# Patient Record
Sex: FEMALE | Race: WHITE | NOT HISPANIC OR LATINO | Employment: OTHER | URBAN - METROPOLITAN AREA
[De-identification: names, ages, dates, MRNs, and addresses within clinical notes are randomized per-mention and may not be internally consistent; named-entity substitution may affect disease eponyms.]

---

## 2017-02-19 ENCOUNTER — RESULTS ENCOUNTER (OUTPATIENT)
Dept: INTERNAL MEDICINE | Facility: CLINIC | Age: 77
End: 2017-02-19

## 2017-02-19 DIAGNOSIS — E11.69 HYPERLIPIDEMIA ASSOCIATED WITH TYPE 2 DIABETES MELLITUS (HCC): ICD-10-CM

## 2017-02-19 DIAGNOSIS — E78.5 HYPERLIPIDEMIA ASSOCIATED WITH TYPE 2 DIABETES MELLITUS (HCC): ICD-10-CM

## 2017-02-19 DIAGNOSIS — E11.21 TYPE 2 DIABETES MELLITUS WITH DIABETIC NEPHROPATHY (HCC): ICD-10-CM

## 2017-02-19 DIAGNOSIS — I10 ESSENTIAL HYPERTENSION: ICD-10-CM

## 2017-02-21 RX ORDER — METOPROLOL TARTRATE 50 MG/1
TABLET, FILM COATED ORAL
Qty: 60 TABLET | Refills: 3 | Status: SHIPPED | OUTPATIENT
Start: 2017-02-21 | End: 2017-06-06 | Stop reason: SDUPTHER

## 2017-03-06 RX ORDER — EZETIMIBE 10 MG/1
TABLET ORAL
Qty: 30 TABLET | Refills: 3 | Status: SHIPPED | OUTPATIENT
Start: 2017-03-06 | End: 2017-07-06 | Stop reason: SDUPTHER

## 2017-05-09 DIAGNOSIS — E78.5 HYPERLIPIDEMIA ASSOCIATED WITH TYPE 2 DIABETES MELLITUS (HCC): ICD-10-CM

## 2017-05-09 DIAGNOSIS — Z00.00 HEALTH CARE MAINTENANCE: Primary | ICD-10-CM

## 2017-05-09 DIAGNOSIS — E11.69 HYPERLIPIDEMIA ASSOCIATED WITH TYPE 2 DIABETES MELLITUS (HCC): ICD-10-CM

## 2017-05-09 DIAGNOSIS — I10 ESSENTIAL HYPERTENSION: ICD-10-CM

## 2017-05-09 DIAGNOSIS — E55.9 VITAMIN D DEFICIENCY: ICD-10-CM

## 2017-05-09 DIAGNOSIS — E11.21 TYPE 2 DIABETES MELLITUS WITH DIABETIC NEPHROPATHY, UNSPECIFIED LONG TERM INSULIN USE STATUS: ICD-10-CM

## 2017-05-09 DIAGNOSIS — E04.1 THYROID NODULE: ICD-10-CM

## 2017-05-12 LAB
25(OH)D3+25(OH)D2 SERPL-MCNC: 32 NG/ML (ref 30–100)
ALBUMIN SERPL-MCNC: 4.3 G/DL (ref 3.5–5.2)
ALBUMIN/CREAT UR: 27 MG/G CREAT (ref 0–30)
ALBUMIN/GLOB SERPL: 1.7 G/DL
ALP SERPL-CCNC: 61 U/L (ref 39–117)
ALT SERPL-CCNC: 31 U/L (ref 1–33)
APPEARANCE UR: ABNORMAL
AST SERPL-CCNC: 22 U/L (ref 1–32)
BACTERIA #/AREA URNS HPF: ABNORMAL /HPF
BACTERIA UR CULT: NORMAL
BACTERIA UR CULT: NORMAL
BASOPHILS # BLD AUTO: 0.06 10*3/MM3 (ref 0–0.2)
BASOPHILS NFR BLD AUTO: 0.8 % (ref 0–1.5)
BILIRUB SERPL-MCNC: 0.2 MG/DL (ref 0.1–1.2)
BILIRUB UR QL STRIP: NEGATIVE
BUN SERPL-MCNC: 23 MG/DL (ref 8–23)
BUN/CREAT SERPL: 30.3 (ref 7–25)
CALCIUM SERPL-MCNC: 10.1 MG/DL (ref 8.6–10.5)
CHLORIDE SERPL-SCNC: 100 MMOL/L (ref 98–107)
CHOLEST SERPL-MCNC: 174 MG/DL (ref 0–200)
CO2 SERPL-SCNC: 28.3 MMOL/L (ref 22–29)
COLOR UR: YELLOW
CREAT SERPL-MCNC: 0.76 MG/DL (ref 0.57–1)
CREAT UR-MCNC: 49.6 MG/DL
CRYSTALS URNS MICRO: ABNORMAL
EOSINOPHIL # BLD AUTO: 0.3 10*3/MM3 (ref 0–0.7)
EOSINOPHIL NFR BLD AUTO: 4.1 % (ref 0.3–6.2)
EPI CELLS #/AREA URNS HPF: ABNORMAL /HPF
ERYTHROCYTE [DISTWIDTH] IN BLOOD BY AUTOMATED COUNT: 14.1 % (ref 11.7–13)
GLOBULIN SER CALC-MCNC: 2.6 GM/DL
GLUCOSE SERPL-MCNC: 149 MG/DL (ref 65–99)
GLUCOSE UR QL: NEGATIVE
HBA1C MFR BLD: 6.5 % (ref 4.8–5.6)
HCT VFR BLD AUTO: 42 % (ref 35.6–45.5)
HDLC SERPL-MCNC: 47 MG/DL (ref 40–60)
HGB BLD-MCNC: 13.4 G/DL (ref 11.9–15.5)
HGB UR QL STRIP: NEGATIVE
IMM GRANULOCYTES # BLD: 0.02 10*3/MM3 (ref 0–0.03)
IMM GRANULOCYTES NFR BLD: 0.3 % (ref 0–0.5)
KETONES UR QL STRIP: NEGATIVE
LDLC SERPL CALC-MCNC: 55 MG/DL (ref 0–100)
LEUKOCYTE ESTERASE UR QL STRIP: ABNORMAL
LYMPHOCYTES # BLD AUTO: 2.87 10*3/MM3 (ref 0.9–4.8)
LYMPHOCYTES NFR BLD AUTO: 39.3 % (ref 19.6–45.3)
MCH RBC QN AUTO: 29.6 PG (ref 26.9–32)
MCHC RBC AUTO-ENTMCNC: 31.9 G/DL (ref 32.4–36.3)
MCV RBC AUTO: 92.9 FL (ref 80.5–98.2)
MICRO URNS: ABNORMAL
MICROALBUMIN UR-MCNC: 13.4 UG/ML
MONOCYTES # BLD AUTO: 0.74 10*3/MM3 (ref 0.2–1.2)
MONOCYTES NFR BLD AUTO: 10.1 % (ref 5–12)
MUCOUS THREADS URNS QL MICRO: PRESENT /HPF
NEUTROPHILS # BLD AUTO: 3.32 10*3/MM3 (ref 1.9–8.1)
NEUTROPHILS NFR BLD AUTO: 45.4 % (ref 42.7–76)
NITRITE UR QL STRIP: NEGATIVE
PH UR STRIP: 8.5 [PH] (ref 5–7.5)
PLATELET # BLD AUTO: 241 10*3/MM3 (ref 140–500)
POTASSIUM SERPL-SCNC: 4.2 MMOL/L (ref 3.5–5.2)
PROT SERPL-MCNC: 6.9 G/DL (ref 6–8.5)
PROT UR QL STRIP: ABNORMAL
RBC # BLD AUTO: 4.52 10*6/MM3 (ref 3.9–5.2)
RBC #/AREA URNS HPF: ABNORMAL /HPF
SODIUM SERPL-SCNC: 142 MMOL/L (ref 136–145)
SP GR UR: 1.02 (ref 1–1.03)
T4 FREE SERPL-MCNC: 1.19 NG/DL (ref 0.93–1.7)
TRIGL SERPL-MCNC: 359 MG/DL (ref 0–150)
TSH SERPL DL<=0.005 MIU/L-ACNC: 4.35 MIU/ML (ref 0.27–4.2)
UNIDENT CRYS URNS QL MICRO: PRESENT /LPF
URINALYSIS REFLEX: ABNORMAL
UROBILINOGEN UR STRIP-MCNC: 0.2 MG/DL (ref 0.2–1)
VLDLC SERPL CALC-MCNC: 71.8 MG/DL (ref 5–40)
WBC # BLD AUTO: 7.31 10*3/MM3 (ref 4.5–10.7)
WBC #/AREA URNS HPF: ABNORMAL /HPF

## 2017-05-15 ENCOUNTER — OFFICE VISIT (OUTPATIENT)
Dept: INTERNAL MEDICINE | Facility: CLINIC | Age: 77
End: 2017-05-15

## 2017-05-15 VITALS
WEIGHT: 214 LBS | DIASTOLIC BLOOD PRESSURE: 66 MMHG | HEART RATE: 65 BPM | RESPIRATION RATE: 18 BRPM | OXYGEN SATURATION: 98 % | SYSTOLIC BLOOD PRESSURE: 120 MMHG | BODY MASS INDEX: 34.39 KG/M2 | HEIGHT: 66 IN

## 2017-05-15 DIAGNOSIS — I65.21 STENOSIS OF RIGHT CAROTID ARTERY: ICD-10-CM

## 2017-05-15 DIAGNOSIS — E78.5 HYPERLIPIDEMIA ASSOCIATED WITH TYPE 2 DIABETES MELLITUS (HCC): ICD-10-CM

## 2017-05-15 DIAGNOSIS — M25.511 ACUTE PAIN OF RIGHT SHOULDER: ICD-10-CM

## 2017-05-15 DIAGNOSIS — Z00.00 MEDICARE ANNUAL WELLNESS VISIT, SUBSEQUENT: Primary | ICD-10-CM

## 2017-05-15 DIAGNOSIS — E11.21 TYPE 2 DIABETES MELLITUS WITH DIABETIC NEPHROPATHY, WITHOUT LONG-TERM CURRENT USE OF INSULIN (HCC): ICD-10-CM

## 2017-05-15 DIAGNOSIS — E11.69 HYPERLIPIDEMIA ASSOCIATED WITH TYPE 2 DIABETES MELLITUS (HCC): ICD-10-CM

## 2017-05-15 DIAGNOSIS — I10 ESSENTIAL HYPERTENSION: ICD-10-CM

## 2017-05-15 PROCEDURE — G0439 PPPS, SUBSEQ VISIT: HCPCS | Performed by: INTERNAL MEDICINE

## 2017-05-15 PROCEDURE — 99214 OFFICE O/P EST MOD 30 MIN: CPT | Performed by: INTERNAL MEDICINE

## 2017-05-17 ENCOUNTER — HOSPITAL ENCOUNTER (OUTPATIENT)
Dept: ULTRASOUND IMAGING | Facility: HOSPITAL | Age: 77
Discharge: HOME | End: 2017-05-17
Attending: OTOLARYNGOLOGY | Admitting: OTOLARYNGOLOGY

## 2017-05-17 DIAGNOSIS — E04.1 THYROID NODULE: ICD-10-CM

## 2017-05-17 PROCEDURE — 76536 US EXAM OF HEAD AND NECK: CPT

## 2017-05-25 ENCOUNTER — HOSPITAL ENCOUNTER (OUTPATIENT)
Dept: PHYSICAL THERAPY | Facility: HOSPITAL | Age: 77
Setting detail: THERAPIES SERIES
Discharge: HOME | End: 2017-05-25

## 2017-05-25 DIAGNOSIS — G89.29 CHRONIC RIGHT SHOULDER PAIN: Primary | ICD-10-CM

## 2017-05-25 DIAGNOSIS — M25.511 CHRONIC RIGHT SHOULDER PAIN: Primary | ICD-10-CM

## 2017-05-25 PROCEDURE — 97161 PT EVAL LOW COMPLEX 20 MIN: CPT

## 2017-05-25 PROCEDURE — G8984 CARRY CURRENT STATUS: HCPCS

## 2017-05-25 PROCEDURE — 97110 THERAPEUTIC EXERCISES: CPT

## 2017-05-25 PROCEDURE — G8985 CARRY GOAL STATUS: HCPCS

## 2017-05-30 ENCOUNTER — HOSPITAL ENCOUNTER (OUTPATIENT)
Dept: PHYSICAL THERAPY | Facility: HOSPITAL | Age: 77
Setting detail: THERAPIES SERIES
Discharge: HOME | End: 2017-05-30

## 2017-05-30 DIAGNOSIS — M25.511 CHRONIC RIGHT SHOULDER PAIN: Primary | ICD-10-CM

## 2017-05-30 DIAGNOSIS — G89.29 CHRONIC RIGHT SHOULDER PAIN: Primary | ICD-10-CM

## 2017-05-30 PROCEDURE — 97140 MANUAL THERAPY 1/> REGIONS: CPT | Performed by: PHYSICAL THERAPIST

## 2017-05-30 PROCEDURE — 97110 THERAPEUTIC EXERCISES: CPT | Performed by: PHYSICAL THERAPIST

## 2017-06-01 ENCOUNTER — HOSPITAL ENCOUNTER (OUTPATIENT)
Dept: PHYSICAL THERAPY | Facility: HOSPITAL | Age: 77
Setting detail: THERAPIES SERIES
Discharge: HOME | End: 2017-06-01

## 2017-06-01 DIAGNOSIS — G89.29 CHRONIC RIGHT SHOULDER PAIN: Primary | ICD-10-CM

## 2017-06-01 DIAGNOSIS — M25.511 CHRONIC RIGHT SHOULDER PAIN: Primary | ICD-10-CM

## 2017-06-01 PROCEDURE — 97140 MANUAL THERAPY 1/> REGIONS: CPT

## 2017-06-01 PROCEDURE — 97110 THERAPEUTIC EXERCISES: CPT

## 2017-06-01 NOTE — THERAPY TREATMENT NOTE
Outpatient Physical Therapy Ortho Treatment Note  [facility]     Patient Name: Casandra Kevin  : 1940  MRN: [id number]  Today's Date: 2017      Visit Date: 2017    Visit Dx:    ICD-10-CM ICD-9-CM   1. Chronic right shoulder pain M25.511 719.41    G89.29 338.29       Patient Active Problem List   Diagnosis   • Amaurosis fugax   • Stenosis of carotid artery   • Chronic obstructive pulmonary disease   • Complication of diabetes mellitus   • Hyperlipidemia associated with type 2 diabetes mellitus   • Essential hypertension   • Diastolic dysfunction   • Adiposity   • Thyroid nodule   • Temporary cerebral vascular dysfunction   • Type 2 diabetes mellitus   • Vitamin D deficiency   • Microscopic hematuria   • Acute pain of right shoulder        Past Medical History:   Diagnosis Date   • COPD (chronic obstructive pulmonary disease)    • Diabetes mellitus    • Hyperlipidemia    • Hypertension         Past Surgical History:   Procedure Laterality Date   • APPENDECTOMY     • CHOLECYSTECTOMY     • TONSILLECTOMY                               PT Assessment/Plan       17 1635       PT Assessment    Assessment Comments Discussed importance of proper posture. Able to add several exercises to HEP. Discussed the importance of ice for pain control/pain/imflammation. Consider adding pulleys next session to work on increasing right shoulder rom.   -       User Key  (r) = Recorded By, (t) = Taken By, (c) = Cosigned By    Initials Name Provider Type    DIA Pino PTA Physical Therapy Assistant                Modalities       17 1545          Ice    Patient denies application of Ice Yes  -        User Key  (r) = Recorded By, (t) = Taken By, (c) = Cosigned By    Initials Name Provider Type    DIA Pino PTA Physical Therapy Assistant                Exercises       17 1543          Subjective Comments    Subjective Comments shoulder pain 6/10 today. Doing exercises at home.  Feel like I have better rom  -      Exercise 1    Exercise Name 1 shoulder rolls  -WS      Reps 1 15  -WS      Exercise 2    Exercise Name 2 scap retraction  -WS      Resistance 2 Red  -WS      Reps 2 15  -WS      Exercise 3    Exercise Name 3 AAROM flexion   self supine  -WS      Exercise 4    Exercise Name 4 isometric ER  -WS      Reps 4 10  -WS      Exercise 5    Exercise Name 5 serratus punch  -WS      Reps 5 15  -WS      Exercise 6    Exercise Name 6 S/L ER   every other day with HEP  -WS      Reps 6 10  -WS        User Key  (r) = Recorded By, (t) = Taken By, (c) = Cosigned By    Initials Name Provider Type    DIA Pino PTA Physical Therapy Assistant                        Manual Rx (last 36 hours)      Manual Treatments       06/01/17 1500          Manual Rx 1    Manual Rx 1 Location PROM,  mobs,ocillations  -        User Key  (r) = Recorded By, (t) = Taken By, (c) = Cosigned By    Initials Name Provider Type    DIA Pino PTA Physical Therapy Assistant                PT OP Goals       06/01/17 1600       PT Short Term Goals    STG Date to Achieve 06/08/17  -     STG 1 Pt will demonstrate understanding and compliance with initial HEP.  -     STG 1 Progress Ongoing  -     STG 2 Pt will demonstrate full PROM without exacerbation of pain.  -     STG 2 Progress Ongoing  -     STG 3 Pt will report reduced pain at worse from 6/10 to less than or equal to 4/10 with use of ice and pain management strategies.  -     STG 3 Progress Ongoing  -     Long Term Goals    LTG Date to Achieve 06/24/17  -     LTG 1 Pt will report overall improved function evidenced by reduced DASH disability score from 15% to 5% or less.  -     LTG 1 Progress Ongoing  -     LTG 2 Pt will demonstrate RUE AROM flexion and abduction to 160 deg or better.  -     LTG 2 Progress Ongoing  -     LTG 3 Pt will demonstrate RUE FIR to L1 to improve her ability to dress.  -     LTG 3 Progress Ongoing  -        User Key  (r) = Recorded By, (t) = Taken By, (c) = Cosigned By    Initials Name Provider Type    DIA Pino PTA Physical Therapy Assistant                Therapy Education       06/01/17 1635          Therapy Education    Given HEP;Symptoms/condition management;Pain management  -WS      Program Reinforced  -WS      How Provided Demonstration  -WS      Provided to Patient  -WS        User Key  (r) = Recorded By, (t) = Taken By, (c) = Cosigned By    Initials Name Provider Type    DIA Pino PTA Physical Therapy Assistant                Time Calculation:   Start Time: 1545  Stop Time: 1630  Time Calculation (min): 45 min    Therapy Charges for Today     Code Description Service Date Service Provider Modifiers Qty    23465401777 HC PT THER PROC EA 15 MIN 6/1/2017 Gage Pino PTA GP 2    01747118838 HC PT MANUAL THERAPY EA 15 MIN 6/1/2017 Gage Pino PTA GP 1                    Gage Pino PTA  6/1/2017

## 2017-06-06 ENCOUNTER — HOSPITAL ENCOUNTER (OUTPATIENT)
Dept: PHYSICAL THERAPY | Facility: HOSPITAL | Age: 77
Setting detail: THERAPIES SERIES
Discharge: HOME | End: 2017-06-06

## 2017-06-06 DIAGNOSIS — G89.29 CHRONIC RIGHT SHOULDER PAIN: Primary | ICD-10-CM

## 2017-06-06 DIAGNOSIS — M25.511 CHRONIC RIGHT SHOULDER PAIN: Primary | ICD-10-CM

## 2017-06-06 PROCEDURE — 97140 MANUAL THERAPY 1/> REGIONS: CPT

## 2017-06-06 PROCEDURE — 97110 THERAPEUTIC EXERCISES: CPT

## 2017-06-06 RX ORDER — METOPROLOL TARTRATE 50 MG/1
TABLET, FILM COATED ORAL
Qty: 60 TABLET | Refills: 3 | Status: SHIPPED | OUTPATIENT
Start: 2017-06-06 | End: 2017-10-04 | Stop reason: SDUPTHER

## 2017-06-06 NOTE — THERAPY TREATMENT NOTE
Outpatient Physical Therapy Ortho Treatment Note  [facility]     Patient Name: Casandra Kevin  : 1940  MRN: [id number]  Today's Date: 2017      Visit Date: 2017    Visit Dx:    ICD-10-CM ICD-9-CM   1. Chronic right shoulder pain M25.511 719.41    G89.29 338.29       Patient Active Problem List   Diagnosis   • Amaurosis fugax   • Stenosis of carotid artery   • Chronic obstructive pulmonary disease   • Complication of diabetes mellitus   • Hyperlipidemia associated with type 2 diabetes mellitus   • Essential hypertension   • Diastolic dysfunction   • Adiposity   • Thyroid nodule   • Temporary cerebral vascular dysfunction   • Type 2 diabetes mellitus   • Vitamin D deficiency   • Microscopic hematuria   • Acute pain of right shoulder        Past Medical History:   Diagnosis Date   • COPD (chronic obstructive pulmonary disease)    • Diabetes mellitus    • Hyperlipidemia    • Hypertension         Past Surgical History:   Procedure Laterality Date   • APPENDECTOMY     • CHOLECYSTECTOMY     • TONSILLECTOMY                               PT Assessment/Plan       17 1538       PT Assessment    Assessment Comments Pt reports significant improvement in condition since evaluation allowing her to increase hours of sleep at night. Pt able to increase repetitions of exercises today as well as add shoulder extension, SL abduction and pulleys. Pt tolerated treatment well without c/o of increased pain. Pt demonstrates increased crepitus with manual ER as well as ER exercises, however pt reports no pain associated with clicking/grinding.   -CN     PT Plan    PT Plan Comments Continue with progressive strengthening exercises and consider DDN if warranted.   -CN       User Key  (r) = Recorded By, (t) = Taken By, (c) = Cosigned By    Initials Name Provider Type    NELLIE Nur, PT Physical Therapist                    Exercises       17 1200          Subjective Comments    Subjective  Comments It feels so much better than when I started. I can roll over in bed now.   -CN      Subjective Pain    Able to rate subjective pain? yes  -CN      Pre-Treatment Pain Level 4  -CN      Post-Treatment Pain Level 3  -CN      Exercise 1    Exercise Name 1 shoulder rolls  -CN      Reps 1 15  -CN      Exercise 2    Exercise Name 2 scap retraction  -CN      Resistance 2 Red  -CN      Sets 2 2  -CN      Reps 2 10  -CN      Exercise 3    Exercise Name 3 AAROM flexion with cane  -CN      Cueing 3 Demo  -CN      Reps 3 10  -CN      Time (Seconds) 3 10  -CN      Exercise 5    Exercise Name 5 serratus punch  -CN      Sets 5 2  -CN      Reps 5 10  -CN      Exercise 6    Exercise Name 6 S/L ER  -CN      Reps 6 15  -CN      Exercise 7    Exercise Name 7 UBE   -CN      Time (Minutes) 7 5  -CN      Exercise 8    Exercise Name 8 Pulleys into flexion  -CN      Cueing 8 Verbal  -CN      Reps 8 15  -CN      Time (Seconds) 8 5  -CN      Exercise 9    Exercise Name 9 SL abd  -CN      Cueing 9 Demo  -CN      Reps 9 15  -CN      Exercise 10    Exercise Name 10 Shoulder extension  -CN      Cueing 10 Demo  -CN      Resistance 10 Red  -CN      Sets 10 2  -CN      Reps 10 10  -CN        User Key  (r) = Recorded By, (t) = Taken By, (c) = Cosigned By    Initials Name Provider Type    NELLIE Nur, PT Physical Therapist                        Manual Rx (last 36 hours)      Manual Treatments       06/06/17 1100          Manual Rx 1    Manual Rx 1 Location PROM,  mobs,ocillations  -CN      Manual Rx 1 Duration 15 min  -CN        User Key  (r) = Recorded By, (t) = Taken By, (c) = Cosigned By    Initials Name Provider Type    NELLIE Nur, PT Physical Therapist                PT OP Goals       06/06/17 1200       PT Short Term Goals    STG Date to Achieve 06/08/17  -CN     STG 1 Pt will demonstrate understanding and compliance with initial HEP.  -CN     STG 1 Progress Met  -CN     STG 1 Progress Comments Pt reports  performing HEP daily and requires only minimal cueing for correct form.   -CN     STG 2 Pt will demonstrate full PROM without exacerbation of pain.  -CN     STG 2 Progress Ongoing  -CN     STG 2 Progress Comments Pt continues to report increased pain at end range of motion in all planes.   -CN     STG 3 Pt will report reduced pain at worse from 6/10 to less than or equal to 4/10 with use of ice and pain management strategies.  -CN     STG 3 Progress Ongoing  -CN     Long Term Goals    LTG Date to Achieve 06/24/17  -CN     LTG 1 Pt will report overall improved function evidenced by reduced DASH disability score from 15% to 5% or less.  -CN     LTG 1 Progress Ongoing  -CN     LTG 2 Pt will demonstrate RUE AROM flexion and abduction to 160 deg or better.  -CN     LTG 2 Progress Ongoing  -CN     LTG 3 Pt will demonstrate RUE FIR to L1 to improve her ability to dress.  -CN     LTG 3 Progress Ongoing  -CN       User Key  (r) = Recorded By, (t) = Taken By, (c) = Cosigned By    Initials Name Provider Type    NELLIE Nur PT Physical Therapist                Therapy Education       06/06/17 1224          Therapy Education    Given HEP;Symptoms/condition management;Pain management  -CN      Program Progressed  -CN      How Provided Demonstration  -CN      Provided to Patient  -CN      Level of Understanding Teach back education performed;Verbalized;Demonstrated  -CN        User Key  (r) = Recorded By, (t) = Taken By, (c) = Cosigned By    Initials Name Provider Type    NELLIE Nur PT Physical Therapist                Time Calculation:   Start Time: 1215  Stop Time: 1300  Time Calculation (min): 45 min    Therapy Charges for Today     Code Description Service Date Service Provider Modifiers Qty    37360833590  PT THER PROC EA 15 MIN 6/6/2017 Julita Nur PT GP 2    36528945766 HC PT MANUAL THERAPY EA 15 MIN 6/6/2017 Julita Nur PT GP 1                    Julita Turner  Liudmila, PT  6/6/2017

## 2017-06-08 ENCOUNTER — HOSPITAL ENCOUNTER (OUTPATIENT)
Dept: PHYSICAL THERAPY | Facility: HOSPITAL | Age: 77
Setting detail: THERAPIES SERIES
Discharge: HOME | End: 2017-06-08

## 2017-06-08 DIAGNOSIS — G89.29 CHRONIC RIGHT SHOULDER PAIN: Primary | ICD-10-CM

## 2017-06-08 DIAGNOSIS — M25.511 CHRONIC RIGHT SHOULDER PAIN: Primary | ICD-10-CM

## 2017-06-08 PROCEDURE — 97140 MANUAL THERAPY 1/> REGIONS: CPT

## 2017-06-08 PROCEDURE — 97110 THERAPEUTIC EXERCISES: CPT

## 2017-06-08 NOTE — THERAPY TREATMENT NOTE
Outpatient Physical Therapy Ortho Treatment Note  [facility]     Patient Name: Casandra Kevin  : 1940  MRN: [id number]  Today's Date: 2017      Visit Date: 2017    Visit Dx:    ICD-10-CM ICD-9-CM   1. Chronic right shoulder pain M25.511 719.41    G89.29 338.29       Patient Active Problem List   Diagnosis   • Amaurosis fugax   • Stenosis of carotid artery   • Chronic obstructive pulmonary disease   • Complication of diabetes mellitus   • Hyperlipidemia associated with type 2 diabetes mellitus   • Essential hypertension   • Diastolic dysfunction   • Adiposity   • Thyroid nodule   • Temporary cerebral vascular dysfunction   • Type 2 diabetes mellitus   • Vitamin D deficiency   • Microscopic hematuria   • Acute pain of right shoulder        Past Medical History:   Diagnosis Date   • COPD (chronic obstructive pulmonary disease)    • Diabetes mellitus    • Hyperlipidemia    • Hypertension         Past Surgical History:   Procedure Laterality Date   • APPENDECTOMY     • CHOLECYSTECTOMY     • TONSILLECTOMY                               PT Assessment/Plan       17 1535       PT Assessment    Assessment Comments Patient reports easier to comb hair and apply hairsray. Patient reports increased pain today. Discussed the use of ice in home setting. Will consider adding 1# to sidelying ER  -WS       User Key  (r) = Recorded By, (t) = Taken By, (c) = Cosigned By    Initials Name Provider Type    DIA Pino PTA Physical Therapy Assistant                Modalities       17 1500          Ice    Ice Applied Yes  -WS      Location right shoulder seated with pillow under arm  -WS      Rx Minutes 10 mins  -WS      Ice S/P Rx Yes  -WS        User Key  (r) = Recorded By, (t) = Taken By, (c) = Cosigned By    Initials Name Provider Type    DIA Pino PTA Physical Therapy Assistant                Exercises       17 1500          Subjective Comments    Subjective Comments  Yesterday and today have increased shoulder pain  -WS      Subjective Pain    Able to rate subjective pain? yes  -WS      Pre-Treatment Pain Level 6  -WS      Subjective Pain Comment Easier to comb hair  -WS      Exercise 1    Exercise Name 1 shoulder rolls  -WS      Reps 1 15  -WS      Exercise 2    Exercise Name 2 scap retraction  -WS      Resistance 2 Red  -WS      Sets 2 2  -WS      Reps 2 10  -WS      Exercise 3    Exercise Name 3 AAROM flexion with cane  -WS      Cueing 3 Demo  -WS      Reps 3 10  -WS      Time (Seconds) 3 10  -WS      Exercise 4    Exercise Name 4 isometric ER  -WS      Reps 4 10  -WS      Exercise 5    Exercise Name 5 serratus punch  -WS      Sets 5 2  -WS      Reps 5 10  -WS      Exercise 6    Exercise Name 6 S/L ER  -WS      Reps 6 15  -WS      Exercise 7    Exercise Name 7 UBE   -WS      Time (Minutes) 7 5  -WS      Exercise 8    Exercise Name 8 Pulleys into flexion  -WS      Cueing 8 Verbal  -WS      Reps 8 15  -WS      Time (Seconds) 8 5  -WS      Exercise 9    Exercise Name 9 SL abd  -WS      Cueing 9 Demo  -WS      Reps 9 15  -WS      Exercise 10    Exercise Name 10 Shoulder extension  -WS      Cueing 10 Demo  -WS      Resistance 10 Red  -WS      Sets 10 2  -WS      Reps 10 10  -WS        User Key  (r) = Recorded By, (t) = Taken By, (c) = Cosigned By    Initials Name Provider Type    DIA Pino PTA Physical Therapy Assistant                        Manual Rx (last 36 hours)      Manual Treatments       06/08/17 1500          Manual Rx 1    Manual Rx 1 Location PROM,  mobs,ocillations  -WS        User Key  (r) = Recorded By, (t) = Taken By, (c) = Cosigned By    Initials Name Provider Type     Gage Pino PTA Physical Therapy Assistant                PT OP Goals       06/08/17 1500       PT Short Term Goals    STG Date to Achieve 06/08/17  -WS     STG 1 Pt will demonstrate understanding and compliance with initial HEP.  -WS     STG 1 Progress Met  -WS     STG 2 Pt will  demonstrate full PROM without exacerbation of pain.  -WS     STG 2 Progress Ongoing  -WS     STG 3 Pt will report reduced pain at worse from 6/10 to less than or equal to 4/10 with use of ice and pain management strategies.  -WS     STG 3 Progress Ongoing  -WS     STG 3 Progress Comments today pain level 6/10  -WS     Long Term Goals    LTG Date to Achieve 06/24/17  -WS     LTG 1 Pt will report overall improved function evidenced by reduced DASH disability score from 15% to 5% or less.  -WS     LTG 1 Progress Ongoing  -WS     LTG 2 Pt will demonstrate RUE AROM flexion and abduction to 160 deg or better.  -WS     LTG 2 Progress Ongoing  -WS     LTG 3 Pt will demonstrate RUE FIR to L1 to improve her ability to dress.  -WS     LTG 3 Progress Ongoing  -WS       User Key  (r) = Recorded By, (t) = Taken By, (c) = Cosigned By    Initials Name Provider Type    DIA Pino PTA Physical Therapy Assistant                Therapy Education       06/08/17 1534          Therapy Education    Given HEP;Symptoms/condition management;Pain management;Edema management  -WS      Program Reinforced  -WS      How Provided Verbal  -WS      Provided to Patient  -WS        User Key  (r) = Recorded By, (t) = Taken By, (c) = Cosigned By    Initials Name Provider Type    DIA Pino PTA Physical Therapy Assistant                Time Calculation:   Start Time: 1500  Stop Time: 1550  Time Calculation (min): 50 min    Therapy Charges for Today     Code Description Service Date Service Provider Modifiers Qty    42439170944 HC PT THER PROC EA 15 MIN 6/8/2017 Gage Pino PTA GP 2    38124042158 HC PT MANUAL THERAPY EA 15 MIN 6/8/2017 Gage Pino PTA GP 1    06077218925 HC PT HOT OR COLD PACK TREAT [facility]ARE 6/8/2017 Gage Pino PTA GP 1                    Gage Pino PTA  6/8/2017

## 2017-06-13 ENCOUNTER — HOSPITAL ENCOUNTER (OUTPATIENT)
Dept: PHYSICAL THERAPY | Facility: HOSPITAL | Age: 77
Setting detail: THERAPIES SERIES
Discharge: HOME | End: 2017-06-13

## 2017-06-13 DIAGNOSIS — M25.511 CHRONIC RIGHT SHOULDER PAIN: Primary | ICD-10-CM

## 2017-06-13 DIAGNOSIS — G89.29 CHRONIC RIGHT SHOULDER PAIN: Primary | ICD-10-CM

## 2017-06-13 PROCEDURE — 97110 THERAPEUTIC EXERCISES: CPT | Performed by: PHYSICAL THERAPIST

## 2017-06-13 PROCEDURE — 97140 MANUAL THERAPY 1/> REGIONS: CPT | Performed by: PHYSICAL THERAPIST

## 2017-06-13 NOTE — THERAPY TREATMENT NOTE
Outpatient Physical Therapy Ortho Treatment Note  [facility]     Patient Name: Casandra Kevin  : 1940  MRN: [id number]  Today's Date: 2017      Visit Date: 2017    Visit Dx:    ICD-10-CM ICD-9-CM   1. Chronic right shoulder pain M25.511 719.41    G89.29 338.29       Patient Active Problem List   Diagnosis   • Amaurosis fugax   • Stenosis of carotid artery   • Chronic obstructive pulmonary disease   • Complication of diabetes mellitus   • Hyperlipidemia associated with type 2 diabetes mellitus   • Essential hypertension   • Diastolic dysfunction   • Adiposity   • Thyroid nodule   • Temporary cerebral vascular dysfunction   • Type 2 diabetes mellitus   • Vitamin D deficiency   • Microscopic hematuria   • Acute pain of right shoulder        Past Medical History:   Diagnosis Date   • COPD (chronic obstructive pulmonary disease)    • Diabetes mellitus    • Hyperlipidemia    • Hypertension         Past Surgical History:   Procedure Laterality Date   • APPENDECTOMY     • CHOLECYSTECTOMY     • TONSILLECTOMY                               PT Assessment/Plan       17 1525       PT Assessment    Assessment Comments Continued trial of DDN today, pt. with excellent tolerance. Reporting overall decreased pain in R lateral shoulder, 2/10. New onset of tricep region pain today, 5/10, will continue to monitor.   -KJ     PT Plan    PT Plan Comments Continue DDN weekly if indicated. Progress shoulder strengthening.   -KJ       User Key  (r) = Recorded By, (t) = Taken By, (c) = Cosigned By    Initials Name Provider Type    LYNDSEY Kevin, PT Physical Therapist                Modalities       17 1400          Ice    Location R shoulder in L side lying following needling  -KJ      Rx Minutes 10 mins  -KJ        User Key  (r) = Recorded By, (t) = Taken By, (c) = Cosigned By    Initials Name Provider Type    LYNDSEY Kevin, PT Physical Therapist                Exercises       17 1400           Subjective Comments    Subjective Comments I'm much better than when I came in. I loved the needling, it was great. I wasn't sore. 2/10 in upper arm, more sore under arm/on back side. 5/10, that just started today.   -KJ      Subjective Pain    Pre-Treatment Pain Level 5  -KJ      Exercise 2    Exercise Name 2 scap retraction  -KJ      Cueing 2 Verbal  -KJ      Resistance 2 Red  -KJ      Reps 2 15  -KJ      Exercise 6    Exercise Name 6 S/L ER  -KJ      Weights/Plates 6 1  -KJ      Sets 6 2  -KJ      Reps 6 10  -KJ      Exercise 7    Exercise Name 7 UBE   -KJ      Time (Minutes) 7 5  -KJ      Exercise 10    Exercise Name 10 Shoulder extension  -KJ      Cueing 10 Verbal  -KJ      Resistance 10 Red  -KJ      Reps 10 15  -KJ        User Key  (r) = Recorded By, (t) = Taken By, (c) = Cosigned By    Initials Name Provider Type    LYNDSEY Kevin, PT Physical Therapist                        Manual Rx (last 36 hours)      Manual Treatments       06/13/17 1500          Manual Rx 1    Manual Rx 1 Type Intramuscular manual therapy with DDN.  Patient position during treatment: L side lying with pillow between knees     Muscles treated: R infraspinatus oblique and transverse fibers    Response: many LTRs noted, minimal pain response.     Clean needle technique observed at all times, precautions for lung fields, neurovascular structures observed.     Manual palpation and assessment performed before, during, and after session.    -KJ        User Key  (r) = Recorded By, (t) = Taken By, (c) = Cosigned By    Initials Name Provider Type    LYNDSEY Kevin, PT Physical Therapist                PT OP Goals       06/13/17 1500       PT Short Term Goals    STG Date to Achieve 06/08/17  -KJ     STG 1 Pt will demonstrate understanding and compliance with initial HEP.  -KJ     STG 1 Progress Met  -KJ     STG 2 Pt will demonstrate full PROM without exacerbation of pain.  -KJ     STG 2 Progress Progressing  -KJ     STG 3 Pt  will report reduced pain at worse from 6/10 to less than or equal to 4/10 with use of ice and pain management strategies.  -KJ     STG 3 Progress Progressing  -KJ     STG 3 Progress Comments Reporting pain 5/10, will continue to monitor.   -KJ     Long Term Goals    LTG Date to Achieve 06/24/17  -KJ     LTG 1 Pt will report overall improved function evidenced by reduced DASH disability score from 15% to 5% or less.  -KJ     LTG 1 Progress Ongoing  -KJ     LTG 1 Progress Comments Not formally assessed to date.   -KJ     LTG 2 Pt will demonstrate RUE AROM flexion and abduction to 160 deg or better.  -KJ     LTG 2 Progress Ongoing  -KJ     LTG 2 Progress Comments Not formally assessed to date.   -KJ     LTG 3 Pt will demonstrate RUE FIR to L1 to improve her ability to dress.  -KJ     LTG 3 Progress Ongoing  -KJ       User Key  (r) = Recorded By, (t) = Taken By, (c) = Cosigned By    Initials Name Provider Type    LYNDSEY Kevin PT Physical Therapist                Therapy Education       06/13/17 1525          Therapy Education    Given HEP;Symptoms/condition management;Pain management;Edema management  -KJ      Program Reinforced  -KJ      How Provided Verbal;Demonstration  -KJ      Provided to Patient  -KJ      Level of Understanding Teach back education performed;Verbalized;Demonstrated  -KJ        User Key  (r) = Recorded By, (t) = Taken By, (c) = Cosigned By    Initials Name Provider Type    LYNDSEY Kevin PT Physical Therapist                Time Calculation:   Start Time: 1445  Stop Time: 1540  Time Calculation (min): 55 min    Therapy Charges for Today     Code Description Service Date Service Provider Modifiers Qty    24553002532 HC PT HOT OR COLD PACK TREAT [facility]ARE 6/13/2017 Naya Kevin, PT GP 1    39749378974 HC PT MANUAL THERAPY EA 15 MIN 6/13/2017 Naya Kevin, PT GP 2    16046229704 HC PT THER PROC EA 15 MIN 6/13/2017 Naya Kevin, PT GP 1                    Naya Kevin  PT  6/13/2017

## 2017-06-15 ENCOUNTER — HOSPITAL ENCOUNTER (OUTPATIENT)
Dept: PHYSICAL THERAPY | Facility: HOSPITAL | Age: 77
Setting detail: THERAPIES SERIES
Discharge: HOME | End: 2017-06-15

## 2017-06-15 DIAGNOSIS — M25.511 CHRONIC RIGHT SHOULDER PAIN: Primary | ICD-10-CM

## 2017-06-15 DIAGNOSIS — G89.29 CHRONIC RIGHT SHOULDER PAIN: Primary | ICD-10-CM

## 2017-06-15 PROCEDURE — 97110 THERAPEUTIC EXERCISES: CPT

## 2017-06-15 PROCEDURE — 97140 MANUAL THERAPY 1/> REGIONS: CPT

## 2017-06-15 NOTE — THERAPY TREATMENT NOTE
Outpatient Physical Therapy Ortho Treatment Note  [facility]     Patient Name: Casandra Kevin  : 1940  MRN: [id number]  Today's Date: 6/15/2017      Visit Date: 06/15/2017    Visit Dx:    ICD-10-CM ICD-9-CM   1. Chronic right shoulder pain M25.511 719.41    G89.29 338.29       Patient Active Problem List   Diagnosis   • Amaurosis fugax   • Stenosis of carotid artery   • Chronic obstructive pulmonary disease   • Complication of diabetes mellitus   • Hyperlipidemia associated with type 2 diabetes mellitus   • Essential hypertension   • Diastolic dysfunction   • Adiposity   • Thyroid nodule   • Temporary cerebral vascular dysfunction   • Type 2 diabetes mellitus   • Vitamin D deficiency   • Microscopic hematuria   • Acute pain of right shoulder        Past Medical History:   Diagnosis Date   • COPD (chronic obstructive pulmonary disease)    • Diabetes mellitus    • Hyperlipidemia    • Hypertension         Past Surgical History:   Procedure Laterality Date   • APPENDECTOMY     • CHOLECYSTECTOMY     • TONSILLECTOMY                               PT Assessment/Plan       06/15/17 1352       PT Assessment    Assessment Comments Patient continues to report improved ability to perfrom ADL's.. Pain continue to be decreaseed. Continue to work on rom/strength. May consider adding serratus punch next visit   -WS       User Key  (r) = Recorded By, (t) = Taken By, (c) = Cosigned By    Initials Name Provider Type    DIA Pino PTA Physical Therapy Assistant                Modalities       06/15/17 1315          Ice    Patient denies application of Ice Yes  -WS        User Key  (r) = Recorded By, (t) = Taken By, (c) = Cosigned By    Initials Name Provider Type    DIA Pino PTA Physical Therapy Assistant                Exercises       06/15/17 1315          Subjective Comments    Subjective Comments Continue to be able to do more. The needling seams to help  -WS      Subjective Pain    Able to rate  subjective pain? yes  -WS      Pre-Treatment Pain Level 3  -WS      Exercise 1    Exercise Name 1 shoulder rolls  -WS      Reps 1 15  -WS      Exercise 2    Exercise Name 2 scap retraction  -WS      Resistance 2 Red  -WS      Reps 2 15  -WS      Exercise 3    Exercise Name 3 AAROM flexion with cane  -WS      Cueing 3 Demo  -WS      Reps 3 10  -WS      Exercise 6    Exercise Name 6 S/L ER  -WS      Weights/Plates 6 1  -WS      Sets 6 2  -WS      Reps 6 10  -WS      Exercise 7    Exercise Name 7 UBE   -WS      Time (Minutes) 7 5  -WS      Exercise 8    Exercise Name 8 Pulleys into flexion  -WS      Reps 8 15  -WS      Exercise 10    Exercise Name 10 Shoulder extension  -WS      Cueing 10 Verbal  -WS      Resistance 10 Red  -WS      Reps 10 15  -WS        User Key  (r) = Recorded By, (t) = Taken By, (c) = Cosigned By    Initials Name Provider Type    DIA Pino PTA Physical Therapy Assistant                        Manual Rx (last 36 hours)      Manual Treatments       06/15/17 1300          Manual Rx 1    Manual Rx 1 Location PROM,  mobs,ocillations  -        User Key  (r) = Recorded By, (t) = Taken By, (c) = Cosigned By    Initials Name Provider Type     Gage Pino PTA Physical Therapy Assistant                PT OP Goals       06/15/17 1300       PT Short Term Goals    STG Date to Achieve 06/08/17  -     STG 1 Pt will demonstrate understanding and compliance with initial HEP.  -     STG 1 Progress Met  -     STG 2 Pt will demonstrate full PROM without exacerbation of pain.  -     STG 2 Progress Progressing  -     STG 3 Pt will report reduced pain at worse from 6/10 to less than or equal to 4/10 with use of ice and pain management strategies.  -     STG 3 Progress Progressing  -     Long Term Goals    LTG Date to Achieve 06/24/17  -     LTG 1 Pt will report overall improved function evidenced by reduced DASH disability score from 15% to 5% or less.  -     LTG 1 Progress Ongoing   -     LTG 2 Pt will demonstrate RUE AROM flexion and abduction to 160 deg or better.  -WS     LTG 2 Progress Ongoing  -WS     LTG 3 Pt will demonstrate RUE FIR to L1 to improve her ability to dress.  -     LTG 3 Progress Ongoing  -       User Key  (r) = Recorded By, (t) = Taken By, (c) = Cosigned By    Initials Name Provider Type    DIA Pino PTA Physical Therapy Assistant                Therapy Education       06/15/17 1352          Therapy Education    Given HEP;Symptoms/condition management;Pain management  -        User Key  (r) = Recorded By, (t) = Taken By, (c) = Cosigned By    Initials Name Provider Type    DIA Pino PTA Physical Therapy Assistant                Time Calculation:   Start Time: 1310  Stop Time: 1355  Time Calculation (min): 45 min    Therapy Charges for Today     Code Description Service Date Service Provider Modifiers Qty    33156361028 HC PT THER PROC EA 15 MIN 6/15/2017 Gage Pino PTA GP 2    89128034178 HC PT MANUAL THERAPY EA 15 MIN 6/15/2017 Gage Pino PTA GP 1                    Gage Pino PTA  6/15/2017

## 2017-06-20 ENCOUNTER — APPOINTMENT (OUTPATIENT)
Dept: PHYSICAL THERAPY | Facility: HOSPITAL | Age: 77
End: 2017-06-20

## 2017-06-22 ENCOUNTER — HOSPITAL ENCOUNTER (OUTPATIENT)
Dept: PHYSICAL THERAPY | Facility: HOSPITAL | Age: 77
Setting detail: THERAPIES SERIES
Discharge: HOME | End: 2017-06-22

## 2017-06-22 DIAGNOSIS — G89.29 CHRONIC RIGHT SHOULDER PAIN: Primary | ICD-10-CM

## 2017-06-22 DIAGNOSIS — M25.511 CHRONIC RIGHT SHOULDER PAIN: Primary | ICD-10-CM

## 2017-06-22 PROCEDURE — 97140 MANUAL THERAPY 1/> REGIONS: CPT

## 2017-06-22 PROCEDURE — 97110 THERAPEUTIC EXERCISES: CPT

## 2017-06-22 NOTE — THERAPY TREATMENT NOTE
Outpatient Physical Therapy Ortho Treatment Note  [facility]     Patient Name: Casandra Kevin  : 1940  MRN: [id number]  Today's Date: 2017      Visit Date: 2017    Visit Dx:    ICD-10-CM ICD-9-CM   1. Chronic right shoulder pain M25.511 719.41    G89.29 338.29       Patient Active Problem List   Diagnosis   • Amaurosis fugax   • Stenosis of carotid artery   • Chronic obstructive pulmonary disease   • Complication of diabetes mellitus   • Hyperlipidemia associated with type 2 diabetes mellitus   • Essential hypertension   • Diastolic dysfunction   • Adiposity   • Thyroid nodule   • Temporary cerebral vascular dysfunction   • Type 2 diabetes mellitus   • Vitamin D deficiency   • Microscopic hematuria   • Acute pain of right shoulder        Past Medical History:   Diagnosis Date   • COPD (chronic obstructive pulmonary disease)    • Diabetes mellitus    • Hyperlipidemia    • Hypertension         Past Surgical History:   Procedure Laterality Date   • APPENDECTOMY     • CHOLECYSTECTOMY     • TONSILLECTOMY                               PT Assessment/Plan       17 1217       PT Assessment    Assessment Comments Continue to progress strengthening. Denied need for ice. Patient hjas near full PROM right shoulder  -WS       User Key  (r) = Recorded By, (t) = Taken By, (c) = Cosigned By    Initials Name Provider Type    DIA Pino PTA Physical Therapy Assistant                Modalities       17 1145          Ice    Patient denies application of Ice Yes  -        User Key  (r) = Recorded By, (t) = Taken By, (c) = Cosigned By    Initials Name Provider Type    DIA Pino PTA Physical Therapy Assistant                Exercises       17 1145          Subjective Comments    Subjective Comments Shoulder is feeling better. Pain has decreased  -      Subjective Pain    Able to rate subjective pain? yes  -WS      Pre-Treatment Pain Level 2  -      Exercise 1     Exercise Name 1 shoulder rolls  -WS      Weights/Plates 1 2  -WS      Reps 1 15  -WS      Exercise 2    Exercise Name 2 scap retraction  -WS      Cueing 2 Verbal  -WS      Resistance 2 Green  -WS      Reps 2 15  -WS      Exercise 3    Exercise Name 3 AAROM flexion with cane  -WS      Cueing 3 Demo  -WS      Reps 3 10  -WS      Exercise 5    Exercise Name 5 serratus punch  -WS      Sets 5 2  -WS      Reps 5 10  -WS      Exercise 6    Exercise Name 6 S/L ER  -WS      Weights/Plates 6 1  -WS      Sets 6 2  -WS      Reps 6 10  -WS      Exercise 7    Exercise Name 7 UBE   -WS      Time (Minutes) 7 5  -WS      Exercise 8    Exercise Name 8 Pulleys into flexion  -WS      Cueing 8 Verbal  -WS      Reps 8 15  -WS      Exercise 10    Exercise Name 10 Shoulder extension  -WS      Cueing 10 Verbal  -WS      Resistance 10 Green  -WS      Sets 10 2  -WS      Reps 10 15  -WS        User Key  (r) = Recorded By, (t) = Taken By, (c) = Cosigned By    Initials Name Provider Type     Gage Pino PTA Physical Therapy Assistant                        Manual Rx (last 36 hours)      Manual Treatments       06/22/17 1205          Manual Rx 1    Manual Rx 1 Location PROM,  mobs,ocillations  -WS      Manual Rx 2    Manual Rx 2 Location rhythmic stab at 90  -WS        User Key  (r) = Recorded By, (t) = Taken By, (c) = Cosigned By    Initials Name Provider Type     Gage Pino PTA Physical Therapy Assistant                PT OP Goals       06/22/17 1200       PT Short Term Goals    STG Date to Achieve 06/08/17  -WS     STG 1 Pt will demonstrate understanding and compliance with initial HEP.  -WS     STG 1 Progress Met  -     STG 2 Pt will demonstrate full PROM without exacerbation of pain.  -WS     STG 2 Progress Progressing  -WS     STG 3 Pt will report reduced pain at worse from 6/10 to less than or equal to 4/10 with use of ice and pain management strategies.  -     STG 3 Progress Met  -     Long Term Goals    LTG Date  to Achieve 06/24/17  -WS     LTG 1 Pt will report overall improved function evidenced by reduced DASH disability score from 15% to 5% or less.  -WS     LTG 1 Progress Ongoing  -WS     LTG 2 Pt will demonstrate RUE AROM flexion and abduction to 160 deg or better.  -WS     LTG 2 Progress Ongoing  -WS     LTG 3 Pt will demonstrate RUE FIR to L1 to improve her ability to dress.  -WS     LTG 3 Progress Ongoing  -WS       User Key  (r) = Recorded By, (t) = Taken By, (c) = Cosigned By    Initials Name Provider Type    DIA Pino PTA Physical Therapy Assistant                Therapy Education       06/22/17 1217          Therapy Education    Given HEP;Symptoms/condition management;Pain management;Edema management  -WS      Program Reinforced  -WS      How Provided Verbal  -WS      Provided to Patient  -WS        User Key  (r) = Recorded By, (t) = Taken By, (c) = Cosigned By    Initials Name Provider Type    DIA Pino PTA Physical Therapy Assistant                Time Calculation:   Start Time: 1145  Stop Time: 1225  Time Calculation (min): 40 min    Therapy Charges for Today     Code Description Service Date Service Provider Modifiers Qty    73413734422 HC PT THER PROC EA 15 MIN 6/22/2017 Gage Pino PTA GP 2    37899482532 HC PT MANUAL THERAPY EA 15 MIN 6/22/2017 Gage Pino PTA GP 1                    Gage Pino PTA  6/22/2017

## 2017-06-27 ENCOUNTER — HOSPITAL ENCOUNTER (OUTPATIENT)
Dept: PHYSICAL THERAPY | Facility: HOSPITAL | Age: 77
Setting detail: THERAPIES SERIES
Discharge: HOME | End: 2017-06-27

## 2017-06-27 DIAGNOSIS — M25.511 CHRONIC RIGHT SHOULDER PAIN: Primary | ICD-10-CM

## 2017-06-27 DIAGNOSIS — G89.29 CHRONIC RIGHT SHOULDER PAIN: Primary | ICD-10-CM

## 2017-06-27 PROCEDURE — G8985 CARRY GOAL STATUS: HCPCS

## 2017-06-27 PROCEDURE — 97110 THERAPEUTIC EXERCISES: CPT

## 2017-06-27 PROCEDURE — G8984 CARRY CURRENT STATUS: HCPCS

## 2017-06-27 PROCEDURE — 97140 MANUAL THERAPY 1/> REGIONS: CPT

## 2017-06-27 NOTE — THERAPY RE-EVALUATION
Outpatient Physical Therapy Ortho Re-Assessment  [facility]     Patient Name: Casandar Kevin  : 1940  MRN: [id number]  Today's Date: 2017      Visit Date: 2017    Patient Active Problem List   Diagnosis   • Amaurosis fugax   • Stenosis of carotid artery   • Chronic obstructive pulmonary disease   • Complication of diabetes mellitus   • Hyperlipidemia associated with type 2 diabetes mellitus   • Essential hypertension   • Diastolic dysfunction   • Adiposity   • Thyroid nodule   • Temporary cerebral vascular dysfunction   • Type 2 diabetes mellitus   • Vitamin D deficiency   • Microscopic hematuria   • Acute pain of right shoulder        Past Medical History:   Diagnosis Date   • COPD (chronic obstructive pulmonary disease)    • Diabetes mellitus    • Hyperlipidemia    • Hypertension         Past Surgical History:   Procedure Laterality Date   • APPENDECTOMY     • CHOLECYSTECTOMY     • TONSILLECTOMY         Visit Dx:     ICD-10-CM ICD-9-CM   1. Chronic right shoulder pain M25.511 719.41    G89.29 338.29                                     PT OP Goals       17 1300       PT Short Term Goals    STG Date to Achieve 17  -LS     STG 1 Pt will demonstrate understanding and compliance with initial HEP.  -LS     STG 1 Progress Met  -LS     STG 2 Pt will demonstrate full PROM without exacerbation of pain.  -LS     STG 2 Progress Progressing  -LS     STG 2 Progress Comments Pain with passive abduction and passive IR/ER  -LS     STG 3 Pt will report reduced pain at worse from 6/10 to less than or equal to 4/10 with use of ice and pain management strategies.  -LS     STG 3 Progress Met  -LS     STG 3 Progress Comments 3/10 at worse   -LS     Long Term Goals    LTG Date to Achieve 17  -LS     LTG 1 Pt will report overall improved function evidenced by reduced DASH disability score from 15% to 5% or less.  -LS     LTG 1 Progress Ongoing  -LS     LTG 1 Progress Comments 10% disability    -LS     LTG 2 Pt will demonstrate RUE AROM flexion and abduction to 160 deg or better.  -LS     LTG 2 Progress Ongoing  -LS     LTG 2 Progress Comments AROM flexion 160 deg; abduction 140 deg  -LS     LTG 3 Pt will demonstrate RUE FIR to L1 to improve her ability to dress.  -LS     LTG 3 Progress Ongoing  -LS     LTG 3 Progress Comments FIR to back pocket  -     Time Calculation    PT Goal Re-Cert Due Date 07/27/17  -       User Key  (r) = Recorded By, (t) = Taken By, (c) = Cosigned By    Initials Name Provider Type    LS Carri Oates, PT Physical Therapist                PT Assessment/Plan       06/27/17 1351       PT Assessment    Functional Limitations Limitations in community activities;Performance in sport activities;Limitations in functional capacity and performance;Performance in self-care ADL;Performance in leisure activities  -     Impairments Muscle strength;Range of motion;Pain;Impaired flexibility;Joint mobility;Posture;Motor function;Impaired postural alignment  -LS     Assessment Comments Pt has participated in 9 skilled PT sessions to treat R shoulder pain. She demonstrates improved function with ability to now fix her hair without high levels of pain. She reports pain at worse 3/10. She demonstrates improved AROM/PROM flexion to 160/168 deg, abduction to 140/150 deg, PHI to T2/60. She remains limited in FIR and is only able to reach back pocket/55 deg PROM. She demonstrates improved postural awareness and is compliant with HEP. Her self reported DASH disability score has reduced from 15% to 10% disability. She will benefit from continued skilled PT services in order to continue to improve her RUE ROM and reduce her pain in order to allow her to perform ADLs and care for her home and elderly mother in law.   -     Please refer to paper survey for additional self-reported information Yes  -LS     Rehab Potential Good  -LS     Patient/caregiver participated in establishment of treatment plan  and goals Yes  -LS     Patient would benefit from skilled therapy intervention Yes  -LS     PT Plan    PT Frequency 2x/week  -LS     Predicted Duration of Therapy Intervention (days/wks) 4 weeks   -LS     Planned CPT's? PT EVAL LOW COMPLEXITY: 78166;PT MANUAL THERAPY EA 15 MIN: 77196;PT THER PROC EA 15 MIN: 06907;PT THER ACT EA 15 MIN: 98537;PT HOT OR COLD PACK TREAT [facility];PT HOT/COLD PACK WC NON[facility]ARE: 11982  -LS     PT Plan Comments Continue DDN if indicated, continue postural strengthening, ROM. Assess benefit of addition to HEP and continue to work to improve IR .  -LS       User Key  (r) = Recorded By, (t) = Taken By, (c) = Cosigned By    Initials Name Provider Type    LS Carri Oates, PT Physical Therapist                  Exercises       06/27/17 1000          Subjective Comments    Subjective Comments My L elbow is hurting but my shouldler is so much better. I can fix my hair without crying.  -LS      Subjective Pain    Able to rate subjective pain? yes  -LS      Pre-Treatment Pain Level 2  -LS      Exercise 1    Exercise Name 1 shoulder rolls  -LS      Weights/Plates 1 2  -LS      Reps 1 15  -LS      Exercise 2    Exercise Name 2 scap retraction  -LS      Cueing 2 Verbal  -LS      Resistance 2 Green  -LS      Reps 2 15  -LS      Exercise 3    Exercise Name 3 AAROM flexion with cane  -LS      Cueing 3 Demo  -LS      Reps 3 10  -LS      Exercise 5    Exercise Name 5 serratus punch  -LS      Sets 5 2  -LS      Reps 5 10  -LS      Exercise 6    Exercise Name 6 S/L ER  -LS      Weights/Plates 6 1  -LS      Sets 6 2  -LS      Reps 6 10  -LS      Exercise 7    Exercise Name 7 UBE   -LS      Time (Minutes) 7 5  -LS      Exercise 8    Exercise Name 8 Pulleys into flexion  -LS      Cueing 8 Verbal  -LS      Reps 8 15  -LS      Exercise 10    Exercise Name 10 Shoulder extension  -LS      Cueing 10 Verbal  -LS      Resistance 10 Green  -LS      Sets 10 2  -LS      Reps 10 15  -LS        User Key  (r) = Recorded By, (t)  = Taken By, (c) = Cosigned By    Initials Name Provider Type    LS Carri Oates PT Physical Therapist                              Outcome Measures       06/27/17 1300          DASH    Open a tight or new jar. 2  -LS      Write 1  -LS      Turn a key 1  -LS      Prepare a meal 2  -LS      Push open a heavy door 3  -LS      Place an object on a shelf above your head 2  -LS      Do heavy household chores (e.g., wash walls, wash floors) 2  -LS      Garden or do yard work 1  -LS      Make a bed 1  -LS      Carry a shopping bag or briefcase 2  -LS      Carry a heavy object (over 10 lbs) 1  -LS      Change a lightbulb overhead 1  -LS      Wash or blow dry your hair 1  -LS      Wash your back 1  -LS      Put on a pullover sweater 1  -LS      Use a knife to cut food 1  -LS      Recreational activities in which require little effort (e.g., cardplaying, knitting, etc.) 1  -LS      Recreational activities in which you take some force or impact through your arm, should or hand (e.g. golf, hammering, tennis, etc.) 2  -LS      Recreational Activities in which you move your arm freely (e.g., frisbee, badminton, etc.) 2  -LS      Manage transportation needs (getting from one place to another) 1  -LS      Sexual Activities 1  -LS      During the past week, to what extent has your arm, shoulder, or hand problem interfered with your normal social activites with family, friends, neighbors or groups? 1  -LS      During the past week, were you limited in your work or other regular daily activities as a result of your arm, shoulder or hand problem? 2  -LS      Arm, Shoulder, or hand pain 1  -LS      Arm, shoulder or hand pain when you performed any specific activity 2  -LS      Tingling (pins and needles) in your arm, shoulder, or hand 1  -LS      Weakness in your arm, shoulder or hand 2  -LS      Stiffness in your arm, shoulder or hand 2  -LS      During the past week, how much difficulty have you had sleeping because of the pain in  your arm, shoulder or hand? 1  -      I feel less capable, less confident or less useful because of my arm, shoulder or hand problem 1  -LS      DASH Sum  43  -LS      Number of Questions Answered 30  -LS      DASH Score 10.83  -LS      Functional Assessment    Outcome Measure Options Disabilities of the Arm, Shoulder, and Hand (DASH)  -        User Key  (r) = Recorded By, (t) = Taken By, (c) = Cosigned By    Initials Name Provider Type    LS Carri Oates PT Physical Therapist            Time Calculation:   Start Time: 1000  Stop Time: 1050  Time Calculation (min): 50 min     Therapy Charges for Today     Code Description Service Date Service Provider Modifiers Qty    34836803601 HC PT THER PROC EA 15 MIN 6/27/2017 Carri Oates, PT GP 2    84728775631 HC PT MANUAL THERAPY EA 15 MIN 6/27/2017 Carri Oates, PT GP 1    25267230152 HC PT CARRY MOV HAND OBJ CURRENT 6/27/2017 Carri Oates PT GP, CI 1    93460279309 HC PT CARRY MOV HAND OBJ PROJECTED 6/27/2017 Carri Oates, PT GP, CH 1          PT G-Codes  PT Professional Judgement Used?: Yes  Outcome Measure Options: Disabilities of the Arm, Shoulder, and Hand (DASH)  Score: 10% disability   Functional Limitation: Carrying, moving and handling objects  Carrying, Moving and Handling Objects Current Status (): At least 1 percent but less than 20 percent impaired, limited or restricted  Carrying, Moving and Handling Objects Goal Status (): 0 percent impaired, limited or restricted         Carri Oates PT  6/27/2017

## 2017-06-29 ENCOUNTER — HOSPITAL ENCOUNTER (OUTPATIENT)
Dept: PHYSICAL THERAPY | Facility: HOSPITAL | Age: 77
Setting detail: THERAPIES SERIES
Discharge: HOME | End: 2017-06-29

## 2017-06-29 DIAGNOSIS — M25.511 CHRONIC RIGHT SHOULDER PAIN: Primary | ICD-10-CM

## 2017-06-29 DIAGNOSIS — G89.29 CHRONIC RIGHT SHOULDER PAIN: Primary | ICD-10-CM

## 2017-06-29 PROCEDURE — 97110 THERAPEUTIC EXERCISES: CPT | Performed by: PHYSICAL THERAPIST

## 2017-06-29 PROCEDURE — 97140 MANUAL THERAPY 1/> REGIONS: CPT | Performed by: PHYSICAL THERAPIST

## 2017-06-29 NOTE — THERAPY TREATMENT NOTE
Outpatient Physical Therapy Ortho Treatment Note  [facility]     Patient Name: Casandra Kevin  : 1940  MRN: [id number]  Today's Date: 2017      Visit Date: 2017    Visit Dx:    ICD-10-CM ICD-9-CM   1. Chronic right shoulder pain M25.511 719.41    G89.29 338.29       Patient Active Problem List   Diagnosis   • Amaurosis fugax   • Stenosis of carotid artery   • Chronic obstructive pulmonary disease   • Complication of diabetes mellitus   • Hyperlipidemia associated with type 2 diabetes mellitus   • Essential hypertension   • Diastolic dysfunction   • Adiposity   • Thyroid nodule   • Temporary cerebral vascular dysfunction   • Type 2 diabetes mellitus   • Vitamin D deficiency   • Microscopic hematuria   • Acute pain of right shoulder        Past Medical History:   Diagnosis Date   • COPD (chronic obstructive pulmonary disease)    • Diabetes mellitus    • Hyperlipidemia    • Hypertension         Past Surgical History:   Procedure Laterality Date   • APPENDECTOMY     • CHOLECYSTECTOMY     • TONSILLECTOMY                               PT Assessment/Plan       17 1009       PT Assessment    Assessment Comments Supine flexion today 155 deg with pain at end range.  Given the amount of crepitation in the joint,  patient is progressing very well with reported improved function.   Would benefit from continued/progressed strengthening of rotator cuff/scapular stabilizers to improved joint stability.     -KT     PT Plan    PT Plan Comments Consider PNF, dynamic tb ex to improve strength of shoulder girdle to allow for more stability.   Continue to work on joint mobiliity and ROM to improve daily function.  Assess if self IR stretch improved functional IR.  -KT       User Key  (r) = Recorded By, (t) = Taken By, (c) = Cosigned By    Initials Name Provider Type    ALHAJI Waddell, PT Physical Therapist                    Exercises       17 0900          Subjective Comments    Subjective  Comments Very little pain, can fix her hair now and use it much more.   Very happy with her progress.  -KT      Subjective Pain    Able to rate subjective pain? yes  -KT      Pre-Treatment Pain Level 1  -KT      Exercise 1    Exercise Name 1 --  -KT      Weights/Plates 1 --  -KT      Reps 1 --  -KT      Exercise 2    Exercise Name 2 scap retraction  -KT      Cueing 2 Verbal  -KT      Resistance 2 Green  -KT      Sets 2 2  -KT      Reps 2 15  -KT      Exercise 3    Exercise Name 3 AAROM flexion with cane  -KT      Cueing 3 Demo  -KT      Reps 3 10  -KT      Time (Seconds) 3 10  -KT      Exercise 4    Exercise Name 4 --  -KT      Reps 4 --  -KT      Exercise 5    Exercise Name 5 serratus punch  -KT      Sets 5 2  -KT      Reps 5 10  -KT      Exercise 6    Exercise Name 6 --  -KT      Weights/Plates 6 --  -KT      Sets 6 --  -KT      Reps 6 --  -KT      Exercise 7    Exercise Name 7 UBE level 2  -KT      Time (Minutes) 7 5  -KT      Exercise 8    Exercise Name 8 --  -KT      Cueing 8 --  -KT      Reps 8 --  -KT      Time (Seconds) 8 --  -KT      Exercise 9    Exercise Name 9 --  -KT      Cueing 9 --  -KT      Reps 9 --  -KT      Exercise 10    Exercise Name 10 Shoulder extension  -KT      Cueing 10 Verbal  -KT      Resistance 10 Green  -KT      Sets 10 2  -KT      Reps 10 15  -KT      Exercise 11    Exercise Name 11 Standing IR/ER  -KT      Cueing 11 Verbal  -KT      Equipment 11 Theraband  -KT      Resistance 11 Red  -KT      Reps 11 10  -KT      Exercise 12    Exercise Name 12 Doorway stretch for ER  -KT      Cueing 12 Tactile  -KT      Reps 12 3  -KT      Time (Seconds) 12 20  -KT      Exercise 13    Exercise Name 13 Mirror circles CW/CCW  -KT      Cueing 13 Verbal  -KT      Reps 13 10   each direction  -KT      Exercise 14    Exercise Name 14 IR stretch in right sidelying  -KT      Cueing 14 Tactile  -KT      Reps 14 3  -KT      Time (Seconds) 14 20  -KT        User Key  (r) = Recorded By, (t) = Taken By, (c) =  Cosigned By    Initials Name Provider Type    ALHAJI Waddell PT Physical Therapist                        Manual Rx (last 36 hours)      Manual Treatments       06/29/17 0900          Manual Rx 1    Manual Rx 1 Location PROM, joint mobs,ocillations of GH joint  -KT      Manual Rx 1 Type --  -KT      Manual Rx 1 Grade I, II  -KT      Manual Rx 1 Duration 15 min  -KT      Manual Rx 2    Manual Rx 2 Location rhythmic stab at 90  -KT        User Key  (r) = Recorded By, (t) = Taken By, (c) = Cosigned By    Initials Name Provider Type    ALHAJI Waddell PT Physical Therapist                    Therapy Education       06/29/17 1008          Therapy Education    Given HEP;Posture/body mechanics;Symptoms/condition management  -KT      Program Progressed   Added self IR stretch in right SL.    -KT      How Provided Written;Verbal;Demonstration  -KT      Provided to Patient  -KT      Level of Understanding Teach back education performed;Verbalized;Demonstrated  -KT        User Key  (r) = Recorded By, (t) = Taken By, (c) = Cosigned By    Initials Name Provider Type    ALHAJI Waddell PT Physical Therapist                Outcome Measures       06/27/17 1300          DASH    Open a tight or new jar. 2  -LS      Write 1  -LS      Turn a key 1  -LS      Prepare a meal 2  -LS      Push open a heavy door 3  -LS      Place an object on a shelf above your head 2  -LS      Do heavy household chores (e.g., wash walls, wash floors) 2  -LS      Garden or do yard work 1  -LS      Make a bed 1  -LS      Carry a shopping bag or briefcase 2  -LS      Carry a heavy object (over 10 lbs) 1  -LS      Change a lightbulb overhead 1  -LS      Wash or blow dry your hair 1  -LS      Wash your back 1  -LS      Put on a pullover sweater 1  -LS      Use a knife to cut food 1  -LS      Recreational activities in which require little effort (e.g., cardplaying, knitting, etc.) 1  -LS      Recreational activities in which you take some force or  impact through your arm, should or hand (e.g. golf, hammering, tennis, etc.) 2  -LS      Recreational Activities in which you move your arm freely (e.g., frisbee, badminton, etc.) 2  -LS      Manage transportation needs (getting from one place to another) 1  -LS      Sexual Activities 1  -LS      During the past week, to what extent has your arm, shoulder, or hand problem interfered with your normal social activites with family, friends, neighbors or groups? 1  -LS      During the past week, were you limited in your work or other regular daily activities as a result of your arm, shoulder or hand problem? 2  -LS      Arm, Shoulder, or hand pain 1  -LS      Arm, shoulder or hand pain when you performed any specific activity 2  -LS      Tingling (pins and needles) in your arm, shoulder, or hand 1  -LS      Weakness in your arm, shoulder or hand 2  -LS      Stiffness in your arm, shoulder or hand 2  -LS      During the past week, how much difficulty have you had sleeping because of the pain in your arm, shoulder or hand? 1  -LS      I feel less capable, less confident or less useful because of my arm, shoulder or hand problem 1  -LS      DASH Sum  43  -LS      Number of Questions Answered 30  -LS      DASH Score 10.83  -LS      Functional Assessment    Outcome Measure Options Disabilities of the Arm, Shoulder, and Hand (DASH)  -LS        User Key  (r) = Recorded By, (t) = Taken By, (c) = Cosigned By    Initials Name Provider Type    LS Carri Oates PT Physical Therapist            Time Calculation:   Start Time: 0915  Stop Time: 1000  Time Calculation (min): 45 min    Therapy Charges for Today     Code Description Service Date Service Provider Modifiers Qty    64521684344 HC PT MANUAL THERAPY EA 15 MIN 6/29/2017 Perla Waddell, PT GP 1    51644817998 HC PT THER PROC EA 15 MIN 6/29/2017 Perla Waddell, PT GP 2                    Perla Waddell PT  6/29/2017

## 2017-07-03 ENCOUNTER — HOSPITAL ENCOUNTER (OUTPATIENT)
Dept: PHYSICAL THERAPY | Facility: HOSPITAL | Age: 77
Setting detail: THERAPIES SERIES
Discharge: HOME | End: 2017-07-03

## 2017-07-03 DIAGNOSIS — M25.511 CHRONIC RIGHT SHOULDER PAIN: Primary | ICD-10-CM

## 2017-07-03 DIAGNOSIS — G89.29 CHRONIC RIGHT SHOULDER PAIN: Primary | ICD-10-CM

## 2017-07-03 PROCEDURE — 97110 THERAPEUTIC EXERCISES: CPT | Performed by: PHYSICAL THERAPIST

## 2017-07-03 PROCEDURE — 97140 MANUAL THERAPY 1/> REGIONS: CPT | Performed by: PHYSICAL THERAPIST

## 2017-07-03 NOTE — THERAPY TREATMENT NOTE
Outpatient Physical Therapy Ortho Treatment Note  [facility]     Patient Name: Casandra Kevin  : 1940  MRN: [id number]  Today's Date: 7/3/2017      Visit Date: 2017    Visit Dx:    ICD-10-CM ICD-9-CM   1. Chronic right shoulder pain M25.511 719.41    G89.29 338.29       Patient Active Problem List   Diagnosis   • Amaurosis fugax   • Stenosis of carotid artery   • Chronic obstructive pulmonary disease   • Complication of diabetes mellitus   • Hyperlipidemia associated with type 2 diabetes mellitus   • Essential hypertension   • Diastolic dysfunction   • Adiposity   • Thyroid nodule   • Temporary cerebral vascular dysfunction   • Type 2 diabetes mellitus   • Vitamin D deficiency   • Microscopic hematuria   • Acute pain of right shoulder        Past Medical History:   Diagnosis Date   • COPD (chronic obstructive pulmonary disease)    • Diabetes mellitus    • Hyperlipidemia    • Hypertension         Past Surgical History:   Procedure Laterality Date   • APPENDECTOMY     • CHOLECYSTECTOMY     • TONSILLECTOMY                               PT Assessment/Plan       17 1642       PT Assessment    Assessment Comments Pt. reporting significantly decreased overall pain and increased function. May be ready for d/c in 1-2 weeks.  -KJ     PT Plan    PT Plan Comments Continue to focus on shoulder stability/strength. Likely d/c in 1-2 weeks.   -KJ       User Key  (r) = Recorded By, (t) = Taken By, (c) = Cosigned By    Initials Name Provider Type    LYNDSEY Kevin, PT Physical Therapist                Modalities       17 1000          Ice    Patient denies application of Ice Yes  -KJ        User Key  (r) = Recorded By, (t) = Taken By, (c) = Cosigned By    Initials Name Provider Type    LYNDSEY Kevin, PT Physical Therapist                Exercises       17 1000          Subjective Comments    Subjective Comments I'm really thrilled with how well I'm doing. Hurt this morning but I  think I slept weird on it. Also really feel it when it's humid.   -KJ      Subjective Pain    Pre-Treatment Pain Level 2  -KJ      Exercise 1    Exercise Name 1 shoulder rolls  -KJ      Weights/Plates 1 2  -KJ      Reps 1 15  -KJ      Exercise 2    Exercise Name 2 scap retraction  -KJ      Cueing 2 Verbal  -KJ      Resistance 2 Green  -KJ      Reps 2 15  -KJ      Exercise 3    Exercise Name 3 AAROM flexion with cane  -KJ      Cueing 3 Demo  -KJ      Reps 3 10  -KJ      Time (Seconds) 3 10  -KJ      Exercise 4    Exercise Name 4 supine horizontal abduction  -KJ      Resistance 4 Green  -KJ      Reps 4 15  -KJ      Exercise 5    Exercise Name 5 serratus punch  -KJ      Sets 5 2  -KJ      Reps 5 10  -KJ      Exercise 6    Exercise Name 6 S/L ER  -KJ      Weights/Plates 6 1  -KJ      Sets 6 2  -KJ      Reps 6 10  -KJ      Exercise 7    Exercise Name 7 UBE level 2  -KJ      Time (Minutes) 7 5  -KJ      Exercise 8    Exercise Name 8 Pulleys into flexion and scaption  -KJ      Cueing 8 Verbal  -KJ      Reps 8 15  -KJ      Exercise 9    Exercise Name 9 SL ER  -KJ      Weights/Plates 9 1  -KJ      Sets 9 2  -KJ      Reps 9 10  -KJ      Exercise 10    Exercise Name 10 Shoulder extension  -KJ      Cueing 10 Verbal  -KJ      Resistance 10 Green  -KJ      Reps 10 15  -KJ      Exercise 11    Exercise Name 11 Standing IR/ER  -KJ      Cueing 11 Verbal  -KJ      Equipment 11 Theraband  -KJ      Resistance 11 Red  -KJ      Reps 11 15  -KJ      Exercise 12    Exercise Name 12 Doorway stretch for ER  -KJ      Cueing 12 Tactile  -KJ      Reps 12 3  -KJ      Time (Seconds) 12 20  -KJ      Exercise 13    Exercise Name 13 Mirror circles CW/CCW  -KJ      Cueing 13 Verbal  -KJ      Reps 13 10   each direction  -KJ      Exercise 14    Exercise Name 14 IR stretch in right sidelying  -KJ      Cueing 14 Tactile  -KJ      Reps 14 3  -KJ      Time (Seconds) 14 20  -KJ        User Key  (r) = Recorded By, (t) = Taken By, (c) = Cosigned By     Initials Name Provider Type    LYNDSEY Kevin, PT Physical Therapist                        Manual Rx (last 36 hours)      Manual Treatments       07/03/17 0900          Manual Rx 1    Manual Rx 1 Location PROM, PA and inferior joint mobs,ocillations of GH joint  -KJ      Manual Rx 1 Grade II-III  -KJ      Manual Rx 2    Manual Rx 2 Location rhythmic stab at 90 at elbow  -KJ      Manual Rx 2 Duration 1 min total   -KJ        User Key  (r) = Recorded By, (t) = Taken By, (c) = Cosigned By    Initials Name Provider Type    LYNDSEY Kevin PT Physical Therapist                PT OP Goals       07/03/17 1600       PT Short Term Goals    STG Date to Achieve 07/11/17  -KJ     STG 1 Pt will demonstrate understanding and compliance with initial HEP.  -KJ     STG 1 Progress Met  -KJ     STG 2 Pt will demonstrate full PROM without exacerbation of pain.  -KJ     STG 2 Progress Progressing  -KJ     STG 2 Progress Comments Minimal pain with PROM today.   -KJ     STG 3 Pt will report reduced pain at worse from 6/10 to less than or equal to 4/10 with use of ice and pain management strategies.  -KJ     STG 3 Progress Met  -KJ     Long Term Goals    LTG Date to Achieve 06/24/17  -KJ     LTG 1 Pt will report overall improved function evidenced by reduced DASH disability score from 15% to 5% or less.  -KJ     LTG 1 Progress Ongoing  -KJ     LTG 1 Progress Comments Not formally assessed to date.  -KJ     LTG 2 Pt will demonstrate RUE AROM flexion and abduction to 160 deg or better.  -KJ     LTG 2 Progress Ongoing  -KJ     LTG 3 Pt will demonstrate RUE FIR to L1 to improve her ability to dress.  -KJ     LTG 3 Progress Ongoing  -KJ       User Key  (r) = Recorded By, (t) = Taken By, (c) = Cosigned By    Initials Name Provider Type    LYNDSEY Kevin, PT Physical Therapist                Therapy Education       07/03/17 1641          Therapy Education    Given HEP;Posture/body mechanics;Symptoms/condition management    Realistic expectations with likely OA, prognosis.   -KJ      Program Progressed  -KJ      How Provided Written;Verbal;Demonstration  -KJ      Provided to Patient  -KJ      Level of Understanding Teach back education performed;Verbalized;Demonstrated  -KJ        User Key  (r) = Recorded By, (t) = Taken By, (c) = Cosigned By    Initials Name Provider Type    LYNDSEY Kevin, PT Physical Therapist                Time Calculation:   Start Time: 1000  Stop Time: 1045  Time Calculation (min): 45 min    Therapy Charges for Today     Code Description Service Date Service Provider Modifiers Qty    11401663552 HC PT MANUAL THERAPY EA 15 MIN 7/3/2017 Naya Kevin, PT GP 1    08717648717 HC PT THER PROC EA 15 MIN 7/3/2017 Naya Kevin, PT GP 2                    Naya Kevin, PT  7/3/2017

## 2017-07-06 ENCOUNTER — HOSPITAL ENCOUNTER (OUTPATIENT)
Dept: PHYSICAL THERAPY | Facility: HOSPITAL | Age: 77
Setting detail: THERAPIES SERIES
Discharge: HOME | End: 2017-07-06

## 2017-07-06 DIAGNOSIS — M25.511 CHRONIC RIGHT SHOULDER PAIN: Primary | ICD-10-CM

## 2017-07-06 DIAGNOSIS — G89.29 CHRONIC RIGHT SHOULDER PAIN: Primary | ICD-10-CM

## 2017-07-06 PROCEDURE — 97140 MANUAL THERAPY 1/> REGIONS: CPT

## 2017-07-06 PROCEDURE — 97110 THERAPEUTIC EXERCISES: CPT

## 2017-07-06 RX ORDER — AMLODIPINE BESYLATE 10 MG/1
TABLET ORAL
Qty: 30 TABLET | Refills: 3 | Status: SHIPPED | OUTPATIENT
Start: 2017-07-06 | End: 2017-11-03 | Stop reason: SDUPTHER

## 2017-07-06 RX ORDER — SITAGLIPTIN AND METFORMIN HYDROCHLORIDE 1000; 50 MG/1; MG/1
TABLET, FILM COATED ORAL
Qty: 60 TABLET | Refills: 3 | Status: SHIPPED | OUTPATIENT
Start: 2017-07-06 | End: 2017-11-03 | Stop reason: SDUPTHER

## 2017-07-06 RX ORDER — EZETIMIBE 10 MG/1
TABLET ORAL
Qty: 30 TABLET | Refills: 3 | Status: SHIPPED | OUTPATIENT
Start: 2017-07-06 | End: 2017-11-03 | Stop reason: SDUPTHER

## 2017-07-06 RX ORDER — ATORVASTATIN CALCIUM 10 MG/1
TABLET, FILM COATED ORAL
Qty: 30 TABLET | Refills: 3 | Status: SHIPPED | OUTPATIENT
Start: 2017-07-06 | End: 2017-11-03 | Stop reason: SDUPTHER

## 2017-07-06 NOTE — THERAPY TREATMENT NOTE
Outpatient Physical Therapy Ortho Treatment Note  [facility]     Patient Name: Casandra Kevin  : 1940  MRN: [id number]  Today's Date: 2017      Visit Date: 2017    Visit Dx:    ICD-10-CM ICD-9-CM   1. Chronic right shoulder pain M25.511 719.41    G89.29 338.29       Patient Active Problem List   Diagnosis   • Amaurosis fugax   • Stenosis of carotid artery   • Chronic obstructive pulmonary disease   • Complication of diabetes mellitus   • Hyperlipidemia associated with type 2 diabetes mellitus   • Essential hypertension   • Diastolic dysfunction   • Adiposity   • Thyroid nodule   • Temporary cerebral vascular dysfunction   • Type 2 diabetes mellitus   • Vitamin D deficiency   • Microscopic hematuria   • Acute pain of right shoulder        Past Medical History:   Diagnosis Date   • COPD (chronic obstructive pulmonary disease)    • Diabetes mellitus    • Hyperlipidemia    • Hypertension         Past Surgical History:   Procedure Laterality Date   • APPENDECTOMY     • CHOLECYSTECTOMY     • TONSILLECTOMY                               PT Assessment/Plan       17 1015       PT Assessment    Assessment Comments Pt continues to report improvements in symptoms and pain rated 2/10 today despite much work this week hanging drapes. Pt is planning to paint 1-2 rooms in her house this week and reports that she will only paint 1 wall per day in order to decrease likelihood of exacerbation of symtoms.   -CN     PT Plan    PT Plan Comments Assess reponse to painting in house. Continue to progress strengthening and stability exercises. Consider D/C in next 1-2 visits pending no increase in symptoms.   -CN       User Key  (r) = Recorded By, (t) = Taken By, (c) = Cosigned By    Initials Name Provider Type    NELLIE Nur, PT Physical Therapist                    Exercises       17 0900          Subjective Comments    Subjective Comments I have been cleaning out my sewing room and I have  "had a little more soreness because of that. I am planning on painting my bedroom this week and am worried that I will be sore after that.   -CN      Subjective Pain    Able to rate subjective pain? yes  -CN      Pre-Treatment Pain Level 2  -CN      Post-Treatment Pain Level 2   \"Slightly better than when I came in.\"  -CN      Exercise 2    Exercise Name 2 scap retraction  -CN      Cueing 2 Verbal  -CN      Resistance 2 Green  -CN      Sets 2 2  -CN      Reps 2 10  -CN      Exercise 4    Exercise Name 4 supine horizontal abduction  -CN      Resistance 4 Green  -CN      Sets 4 2  -CN      Reps 4 10  -CN      Exercise 5    Exercise Name 5 serratus punch  -CN      Weights/Plates 5 2  -CN      Sets 5 2  -CN      Reps 5 10  -CN      Exercise 6    Exercise Name 6 S/L ER  -CN      Weights/Plates 6 1  -CN      Sets 6 2  -CN      Reps 6 10  -CN      Exercise 7    Exercise Name 7 UBE level 2  -CN      Time (Minutes) 7 5  -CN      Exercise 9    Exercise Name 9 SL ER  -CN      Weights/Plates 9 1  -CN      Sets 9 2  -CN      Reps 9 10  -CN      Exercise 10    Exercise Name 10 Shoulder extension  -CN      Cueing 10 Verbal  -CN      Resistance 10 Green  -CN      Sets 10 2  -CN      Reps 10 10  -CN      Exercise 11    Exercise Name 11 Standing IR/ER  -CN      Cueing 11 Verbal  -CN      Equipment 11 Theraband  -CN      Resistance 11 Green  -CN      Sets 11 2  -CN      Reps 11 10  -CN      Exercise 12    Exercise Name 12 Doorway stretch for ER  -CN      Cueing 12 Tactile  -CN      Reps 12 3  -CN      Time (Seconds) 12 20  -CN      Exercise 15    Exercise Name 15 IR towel stretch  -CN      Cueing 15 Demo  -CN      Reps 15 10  -CN      Time (Seconds) 15 10  -CN        User Key  (r) = Recorded By, (t) = Taken By, (c) = Cosigned By    Initials Name Provider Type    CN Julita Nur PT Physical Therapist                        Manual Rx (last 36 hours)      Manual Treatments       07/06/17 0900          Manual Rx 1    Manual Rx 1 " Location PROM, PA and inferior joint mobs,ocillations of GH joint  -CN      Manual Rx 1 Grade II-III  -CN      Manual Rx 2    Manual Rx 2 Location rhythmic stab at 90 at elbow  -CN      Manual Rx 2 Duration 20 min  -CN        User Key  (r) = Recorded By, (t) = Taken By, (c) = Cosigned By    Initials Name Provider Type    NELLIE Nur, PT Physical Therapist                PT OP Goals       07/06/17 1000       PT Short Term Goals    STG Date to Achieve 07/11/17  -CN     STG 1 Pt will demonstrate understanding and compliance with initial HEP.  -CN     STG 1 Progress Met  -CN     STG 2 Pt will demonstrate full PROM without exacerbation of pain.  -CN     STG 2 Progress Progressing  -CN     STG 2 Progress Comments Pt reports pain into abduction today which pt attributes to weather changes.   -CN     STG 3 Pt will report reduced pain at worse from 6/10 to less than or equal to 4/10 with use of ice and pain management strategies.  -CN     STG 3 Progress Met  -CN     Long Term Goals    LTG Date to Achieve 06/24/17  -CN     LTG 1 Pt will report overall improved function evidenced by reduced DASH disability score from 15% to 5% or less.  -CN     LTG 1 Progress Ongoing  -CN     LTG 2 Pt will demonstrate RUE AROM flexion and abduction to 160 deg or better.  -CN     LTG 2 Progress Ongoing  -CN     LTG 3 Pt will demonstrate RUE FIR to L1 to improve her ability to dress.  -CN     LTG 3 Progress Ongoing  -CN       User Key  (r) = Recorded By, (t) = Taken By, (c) = Cosigned By    Initials Name Provider Type    NELLIE Nur, PT Physical Therapist                Therapy Education       07/06/17 0940          Therapy Education    Given HEP;Posture/body mechanics;Symptoms/condition management  -CN      Program Progressed  -CN      How Provided Verbal;Demonstration  -CN      Provided to Patient  -CN      Level of Understanding Teach back education performed;Verbalized;Demonstrated  -CN        User Key  (r) =  Recorded By, (t) = Taken By, (c) = Cosigned By    Initials Name Provider Type    CN Julita Nur, PT Physical Therapist                Time Calculation:   Start Time: 0917  Stop Time: 1000  Time Calculation (min): 43 min    Therapy Charges for Today     Code Description Service Date Service Provider Modifiers Qty    98645931363  PT THER PROC EA 15 MIN 7/6/2017 Julita Nur, PT GP 2    [id number]  PT MANUAL THERAPY EA 15 MIN 7/6/2017 Julita Nur, PT GP 1                    Julita Nur, PT  7/6/2017

## 2017-07-11 ENCOUNTER — HOSPITAL ENCOUNTER (OUTPATIENT)
Dept: PHYSICAL THERAPY | Facility: HOSPITAL | Age: 77
Setting detail: THERAPIES SERIES
Discharge: HOME | End: 2017-07-11

## 2017-07-11 DIAGNOSIS — G89.29 CHRONIC RIGHT SHOULDER PAIN: Primary | ICD-10-CM

## 2017-07-11 DIAGNOSIS — M25.511 CHRONIC RIGHT SHOULDER PAIN: Primary | ICD-10-CM

## 2017-07-11 PROCEDURE — 97140 MANUAL THERAPY 1/> REGIONS: CPT

## 2017-07-11 PROCEDURE — 97110 THERAPEUTIC EXERCISES: CPT

## 2017-07-11 NOTE — THERAPY TREATMENT NOTE
Outpatient Physical Therapy Ortho Treatment Note  [facility]     Patient Name: Casandra Kevin  : 1940  MRN: [id number]  Today's Date: 2017      Visit Date: 2017    Visit Dx:    ICD-10-CM ICD-9-CM   1. Chronic right shoulder pain M25.511 719.41    G89.29 338.29       Patient Active Problem List   Diagnosis   • Amaurosis fugax   • Stenosis of carotid artery   • Chronic obstructive pulmonary disease   • Complication of diabetes mellitus   • Hyperlipidemia associated with type 2 diabetes mellitus   • Essential hypertension   • Diastolic dysfunction   • Adiposity   • Thyroid nodule   • Temporary cerebral vascular dysfunction   • Type 2 diabetes mellitus   • Vitamin D deficiency   • Microscopic hematuria   • Acute pain of right shoulder        Past Medical History:   Diagnosis Date   • COPD (chronic obstructive pulmonary disease)    • Diabetes mellitus    • Hyperlipidemia    • Hypertension         Past Surgical History:   Procedure Laterality Date   • APPENDECTOMY     • CHOLECYSTECTOMY     • TONSILLECTOMY                               PT Assessment/Plan       17 1409       PT Assessment    Assessment Comments Pt reports slightly increased pain today secondary to housework/assembling furniture over the weekend. Pt states that she was able to manage pain with exercises, however increased feelings of stiffness over past few days. Pt demonstrates decreased shoulder flexibility today which improved with manual stretching and exercsies. Discussed D/C plans as pt's symptoms slightly increased since last visit. Pt to continue with 1 visit next week with plan to D/C to independent HEP.   -CN     PT Plan    PT Plan Comments Plan to D/C to independent HEP next visit. Assess response to housework.   -CN       User Key  (r) = Recorded By, (t) = Taken By, (c) = Cosigned By    Initials Name Provider Type    NELLIE Nur, PT Physical Therapist                    Exercises       17 1100           Subjective Comments    Subjective Comments I moved some furniture around and it is hurting a little more today.   -CN      Subjective Pain    Able to rate subjective pain? yes  -CN      Pre-Treatment Pain Level 2  -CN      Post-Treatment Pain Level 2  -CN      Exercise 3    Exercise Name 3 AAROM flexion with cane  -CN      Cueing 3 Demo  -CN      Reps 3 10  -CN      Time (Seconds) 3 10  -CN      Exercise 4    Exercise Name 4 supine horizontal abduction  -CN      Resistance 4 Green  -CN      Sets 4 2  -CN      Reps 4 10  -CN      Exercise 5    Exercise Name 5 serratus punch  -CN      Weights/Plates 5 2  -CN      Sets 5 2  -CN      Reps 5 10  -CN      Exercise 6    Exercise Name 6 S/L ER  -CN      Weights/Plates 6 2  -CN      Sets 6 2  -CN      Reps 6 10  -CN      Exercise 7    Exercise Name 7 UBE level 2  -CN      Time (Minutes) 7 5  -CN      Exercise 9    Exercise Name 9 SL abduction  -CN      Weights/Plates 9 2  -CN      Sets 9 2  -CN      Reps 9 10  -CN        User Key  (r) = Recorded By, (t) = Taken By, (c) = Cosigned By    Initials Name Provider Type    NELLIE Nur, PT Physical Therapist                        Manual Rx (last 36 hours)      Manual Treatments       07/11/17 1100          Manual Rx 1    Manual Rx 1 Location PROM, PA and inferior joint mobs,ocillations of GH joint  -CN      Manual Rx 1 Grade II-III  -CN        User Key  (r) = Recorded By, (t) = Taken By, (c) = Cosigned By    Initials Name Provider Type    NELLIE Nur, BRUCE Physical Therapist                PT OP Goals       07/11/17 1200       PT Short Term Goals    STG Date to Achieve 07/11/17  -CN     STG 1 Pt will demonstrate understanding and compliance with initial HEP.  -CN     STG 1 Progress Met  -CN     STG 2 Pt will demonstrate full PROM without exacerbation of pain.  -CN     STG 2 Progress Progressing  -CN     STG 3 Pt will report reduced pain at worse from 6/10 to less than or equal to 4/10 with use of  ice and pain management strategies.  -CN     STG 3 Progress Met  -CN     Long Term Goals    LTG Date to Achieve 06/24/17  -CN     LTG 1 Pt will report overall improved function evidenced by reduced DASH disability score from 15% to 5% or less.  -CN     LTG 1 Progress Ongoing  -CN     LTG 2 Pt will demonstrate RUE AROM flexion and abduction to 160 deg or better.  -CN     LTG 2 Progress Ongoing  -CN     LTG 3 Pt will demonstrate RUE FIR to L1 to improve her ability to dress.  -CN     LTG 3 Progress Ongoing  -CN       User Key  (r) = Recorded By, (t) = Taken By, (c) = Cosigned By    Initials Name Provider Type    NELLIE Nur PT Physical Therapist                Therapy Education       07/11/17 1200          Therapy Education    Given HEP;Posture/body mechanics;Symptoms/condition management  -CN      Program Progressed  -CN      How Provided Verbal;Demonstration  -CN      Provided to Patient  -CN      Level of Understanding Teach back education performed;Verbalized;Demonstrated  -CN        User Key  (r) = Recorded By, (t) = Taken By, (c) = Cosigned By    Initials Name Provider Type    NELLIE Nur PT Physical Therapist                Time Calculation:   Start Time: 1132  Stop Time: 1215  Time Calculation (min): 43 min    Therapy Charges for Today     Code Description Service Date Service Provider Modifiers Qty    [id number]  PT THER PROC EA 15 MIN 7/11/2017 Julita Nur PT GP 2    [id number]  PT MANUAL THERAPY EA 15 MIN 7/11/2017 Julita Nur PT GP 1                    Julita Nur PT  7/11/2017

## 2017-07-13 ENCOUNTER — APPOINTMENT (OUTPATIENT)
Dept: PHYSICAL THERAPY | Facility: HOSPITAL | Age: 77
End: 2017-07-13

## 2017-07-17 ENCOUNTER — APPOINTMENT (OUTPATIENT)
Dept: PHYSICAL THERAPY | Facility: HOSPITAL | Age: 77
End: 2017-07-17

## 2017-07-19 ENCOUNTER — APPOINTMENT (OUTPATIENT)
Dept: PHYSICAL THERAPY | Facility: HOSPITAL | Age: 77
End: 2017-07-19

## 2017-08-07 RX ORDER — QUINAPRIL HCL AND HYDROCHLOROTHIAZIDE 20; 12.5 MG/1; MG/1
TABLET ORAL
Qty: 60 TABLET | Refills: 3 | Status: SHIPPED | OUTPATIENT
Start: 2017-08-07 | End: 2017-12-03 | Stop reason: SDUPTHER

## 2017-08-07 RX ORDER — FENOFIBRATE 145 MG/1
TABLET, COATED ORAL
Qty: 30 TABLET | Refills: 3 | Status: SHIPPED | OUTPATIENT
Start: 2017-08-07 | End: 2017-12-03 | Stop reason: SDUPTHER

## 2017-08-07 RX ORDER — OMEGA-3-ACID ETHYL ESTERS 1 G/1
CAPSULE, LIQUID FILLED ORAL
Qty: 120 CAPSULE | Refills: 3 | Status: SHIPPED | OUTPATIENT
Start: 2017-08-07 | End: 2017-12-03 | Stop reason: SDUPTHER

## 2017-10-04 RX ORDER — METOPROLOL TARTRATE 50 MG/1
TABLET, FILM COATED ORAL
Qty: 60 TABLET | Refills: 3 | Status: SHIPPED | OUTPATIENT
Start: 2017-10-04 | End: 2018-03-27 | Stop reason: SDUPTHER

## 2017-11-03 RX ORDER — AMLODIPINE BESYLATE 10 MG/1
TABLET ORAL
Qty: 30 TABLET | Refills: 3 | Status: SHIPPED | OUTPATIENT
Start: 2017-11-03 | End: 2018-03-27 | Stop reason: SDUPTHER

## 2017-11-03 RX ORDER — ATORVASTATIN CALCIUM 10 MG/1
TABLET, FILM COATED ORAL
Qty: 30 TABLET | Refills: 3 | Status: SHIPPED | OUTPATIENT
Start: 2017-11-03 | End: 2018-03-27 | Stop reason: SDUPTHER

## 2017-11-03 RX ORDER — EZETIMIBE 10 MG/1
TABLET ORAL
Qty: 30 TABLET | Refills: 3 | Status: SHIPPED | OUTPATIENT
Start: 2017-11-03 | End: 2018-03-27 | Stop reason: SDUPTHER

## 2017-11-03 RX ORDER — SITAGLIPTIN AND METFORMIN HYDROCHLORIDE 1000; 50 MG/1; MG/1
TABLET, FILM COATED ORAL
Qty: 60 TABLET | Refills: 3 | Status: SHIPPED | OUTPATIENT
Start: 2017-11-03 | End: 2018-04-19 | Stop reason: SDUPTHER

## 2017-11-08 LAB
ALBUMIN SERPL-MCNC: 4.8 G/DL (ref 3.5–5.2)
ALBUMIN/GLOB SERPL: 2.1 G/DL
ALP SERPL-CCNC: 67 U/L (ref 39–117)
ALT SERPL-CCNC: 31 U/L (ref 1–33)
AST SERPL-CCNC: 19 U/L (ref 1–32)
BILIRUB SERPL-MCNC: 0.3 MG/DL (ref 0.1–1.2)
BUN SERPL-MCNC: 23 MG/DL (ref 8–23)
BUN/CREAT SERPL: 27.7 (ref 7–25)
CALCIUM SERPL-MCNC: 10.1 MG/DL (ref 8.6–10.5)
CHLORIDE SERPL-SCNC: 101 MMOL/L (ref 98–107)
CHOLEST SERPL-MCNC: 175 MG/DL (ref 0–200)
CO2 SERPL-SCNC: 27.7 MMOL/L (ref 22–29)
CREAT SERPL-MCNC: 0.83 MG/DL (ref 0.57–1)
GFR SERPLBLD CREATININE-BSD FMLA CKD-EPI: 67 ML/MIN/1.73
GFR SERPLBLD CREATININE-BSD FMLA CKD-EPI: 81 ML/MIN/1.73
GLOBULIN SER CALC-MCNC: 2.3 GM/DL
GLUCOSE SERPL-MCNC: 146 MG/DL (ref 65–99)
HBA1C MFR BLD: 7.09 % (ref 4.8–5.6)
HDLC SERPL-MCNC: 53 MG/DL (ref 40–60)
LDLC SERPL CALC-MCNC: 58 MG/DL (ref 0–100)
POTASSIUM SERPL-SCNC: 4.3 MMOL/L (ref 3.5–5.2)
PROT SERPL-MCNC: 7.1 G/DL (ref 6–8.5)
SODIUM SERPL-SCNC: 143 MMOL/L (ref 136–145)
TRIGL SERPL-MCNC: 319 MG/DL (ref 0–150)
VLDLC SERPL CALC-MCNC: 63.8 MG/DL (ref 5–40)

## 2017-11-11 ENCOUNTER — RESULTS ENCOUNTER (OUTPATIENT)
Dept: INTERNAL MEDICINE | Facility: CLINIC | Age: 77
End: 2017-11-11

## 2017-11-11 DIAGNOSIS — E78.5 HYPERLIPIDEMIA ASSOCIATED WITH TYPE 2 DIABETES MELLITUS (HCC): ICD-10-CM

## 2017-11-11 DIAGNOSIS — E11.21 TYPE 2 DIABETES MELLITUS WITH DIABETIC NEPHROPATHY, WITHOUT LONG-TERM CURRENT USE OF INSULIN (HCC): ICD-10-CM

## 2017-11-11 DIAGNOSIS — E11.69 HYPERLIPIDEMIA ASSOCIATED WITH TYPE 2 DIABETES MELLITUS (HCC): ICD-10-CM

## 2017-11-11 DIAGNOSIS — I10 ESSENTIAL HYPERTENSION: ICD-10-CM

## 2017-11-15 ENCOUNTER — OFFICE VISIT (OUTPATIENT)
Dept: INTERNAL MEDICINE | Facility: CLINIC | Age: 77
End: 2017-11-15

## 2017-11-15 VITALS
RESPIRATION RATE: 18 BRPM | OXYGEN SATURATION: 98 % | WEIGHT: 212 LBS | HEART RATE: 67 BPM | SYSTOLIC BLOOD PRESSURE: 120 MMHG | DIASTOLIC BLOOD PRESSURE: 70 MMHG | HEIGHT: 66 IN | BODY MASS INDEX: 34.07 KG/M2

## 2017-11-15 DIAGNOSIS — J43.9 PULMONARY EMPHYSEMA, UNSPECIFIED EMPHYSEMA TYPE (HCC): ICD-10-CM

## 2017-11-15 DIAGNOSIS — E78.5 HYPERLIPIDEMIA ASSOCIATED WITH TYPE 2 DIABETES MELLITUS (HCC): ICD-10-CM

## 2017-11-15 DIAGNOSIS — E11.21 TYPE 2 DIABETES MELLITUS WITH DIABETIC NEPHROPATHY, WITHOUT LONG-TERM CURRENT USE OF INSULIN (HCC): Primary | ICD-10-CM

## 2017-11-15 DIAGNOSIS — I10 ESSENTIAL HYPERTENSION: ICD-10-CM

## 2017-11-15 DIAGNOSIS — E11.69 HYPERLIPIDEMIA ASSOCIATED WITH TYPE 2 DIABETES MELLITUS (HCC): ICD-10-CM

## 2017-11-15 PROCEDURE — 99214 OFFICE O/P EST MOD 30 MIN: CPT | Performed by: INTERNAL MEDICINE

## 2017-11-15 NOTE — PROGRESS NOTES
Chief Complaint  Casandra Kevin is a 76 y.o. female who presents for Hypertension; Hyperlipidemia; Diabetes; and Follow-up (6 month)  .    History of Present Illness   Casandra is here for routine follow up on her  HTN, HLD, and DM.  She is having a hard time getting getting her bp down.  ON her wrist cuff, the SBP runs in the 140's.  NO cp or palp or dizziness or soa.  She really watches her salt intake.  She has been on this med regimen for a while.  Her blood sugars were a little high around [facility].  She is painting her house and is up and down a ladder and stairs.  She is getting a lot of exercise right now.    She had a flu shot at [facility]'s office. Her lung function is stable.   She is going to be in [place] for 4 months taking care of her 103 yr old Mother-in-law.        Review of Systems   Cardiovascular: Negative for chest pain, dyspnea on exertion, leg swelling and palpitations.   Respiratory: Negative for shortness of breath.    Endocrine: Negative for polydipsia and polyuria.   Musculoskeletal: Negative for myalgias.   Neurological: Negative for dizziness and light-headedness.       Patient Active Problem List   Diagnosis   • Amaurosis fugax   • Stenosis of carotid artery   • Chronic obstructive pulmonary disease   • Complication of diabetes mellitus   • Hyperlipidemia associated with type 2 diabetes mellitus   • Essential hypertension   • Diastolic dysfunction   • Adiposity   • Thyroid nodule   • Temporary cerebral vascular dysfunction   • Type 2 diabetes mellitus   • Vitamin D deficiency   • Microscopic hematuria   • Acute pain of right shoulder       Past Medical History:   Diagnosis Date   • COPD (chronic obstructive pulmonary disease)    • Diabetes mellitus    • Hyperlipidemia    • Hypertension        Past Surgical History:   Procedure Laterality Date   • APPENDECTOMY     • CHOLECYSTECTOMY     • TONSILLECTOMY         Family History   Problem Relation Age of Onset   • Aneurysm Mother    •  Hyperlipidemia Mother    • Cancer Father      throat secondary to smoking   • Coronary artery disease Brother        Social History     Social History   • Marital status:      Spouse name: N/A   • Number of children: 3   • Years of education: N/A     Occupational History   • retired teacher/principle      Social History Main Topics   • Smoking status: Former Smoker     Years: 3.00   • Smokeless tobacco: Never Used      Comment: from [age]   • Alcohol use No   • Drug use: No   • Sexual activity: Not on file     Other Topics Concern   • Not on file     Social History Narrative       Current Outpatient Prescriptions on File Prior to Visit   Medication Sig Dispense Refill   • albuterol (PROVENTIL HFA;VENTOLIN HFA) 108 (90 BASE) MCG/ACT inhaler every 4 (four) hours.     • amLODIPine (NORVASC) 10 MG tablet take 1 tablet by mouth once daily 30 tablet 3   • ascorbic acid (VITAMIN C) 1000 MG tablet Take by mouth.     • atorvastatin (LIPITOR) 10 MG tablet take 1 tablet by mouth once daily 30 tablet 3   • budesonide-formoterol (SYMBICORT) 160-4.5 MCG/ACT inhaler 2 (two) times a day.     • Cholecalciferol (VITAMIN D) 2000 UNITS capsule Take by mouth.     • clopidogrel (PLAVIX) 75 MG tablet Take by mouth.     • ezetimibe (ZETIA) 10 MG tablet take 1 tablet by mouth once daily 30 tablet 3   • fenofibrate (TRICOR) 145 MG tablet take 1 tablet by mouth once daily 30 tablet 3   • JANUMET  MG per tablet take 1 tablet by mouth twice a day with meals 60 tablet 3   • metoprolol tartrate (LOPRESSOR) 50 MG tablet take 1 tablet by mouth twice a day 60 tablet 3   • omega-3 acid ethyl esters (LOVAZA) 1 G capsule take 4 capsules by mouth once daily 120 capsule 3   • quinapril-hydrochlorothiazide (ACCURETIC) 20-12.5 MG per tablet take 2 tablets once daily 60 tablet 3   • vitamin B-12 (CYANOCOBALAMIN) 500 MCG tablet Take by mouth.     • [DISCONTINUED] quinapril-hydrochlorothiazide (ACCURETIC) 20-12.5 MG per tablet take 2 tablets by  "mouth once daily 60 tablet 3     No current facility-administered medications on file prior to visit.        Allergies   Allergen Reactions   • Codeine        Vitals:    11/15/17 1033   BP: 120/70   Pulse: 67   Resp: 18   SpO2: 98%   Weight: 212 lb (96.2 kg)   Height: 66\" (167.6 cm)       Body mass index is 34.22 kg/(m^2).    Objective   Physical Exam   Constitutional: She is oriented to person, place, and time. She appears well-developed and well-nourished. No distress.   HENT:   Head: Normocephalic and atraumatic.   Eyes: Conjunctivae are normal. No scleral icterus.   Neck: Normal range of motion. Neck supple.   Cardiovascular: Normal rate, regular rhythm and normal heart sounds.  Exam reveals no gallop and no friction rub.    No murmur heard.  Pulmonary/Chest: Effort normal and breath sounds normal. No respiratory distress. She has no wheezes. She has no rales.   Musculoskeletal: She exhibits no edema.   Lymphadenopathy:     She has no cervical adenopathy.   Neurological: She is alert and oriented to person, place, and time. No cranial nerve deficit.   Psychiatric: She has a normal mood and affect. Her behavior is normal. Judgment and thought content normal.       Results for orders placed or performed in visit on 11/11/17   Comprehensive Metabolic Panel   Result Value Ref Range    Glucose 146 (H) 65 - 99 mg/dL    BUN 23 8 - 23 mg/dL    Creatinine 0.83 0.57 - 1.00 mg/dL    eGFR Non African Am 67 >60 mL/min/1.73    eGFR African Am 81 >60 mL/min/1.73    BUN/Creatinine Ratio 27.7 (H) 7.0 - 25.0    Sodium 143 136 - 145 mmol/L    Potassium 4.3 3.5 - 5.2 mmol/L    Chloride 101 98 - 107 mmol/L    Total CO2 27.7 22.0 - 29.0 mmol/L    Calcium 10.1 8.6 - 10.5 mg/dL    Total Protein 7.1 6.0 - 8.5 g/dL    Albumin 4.80 3.50 - 5.20 g/dL    Globulin 2.3 gm/dL    A/G Ratio 2.1 g/dL    Total Bilirubin 0.3 0.1 - 1.2 mg/dL    Alkaline Phosphatase 67 39 - 117 U/L    AST (SGOT) 19 1 - 32 U/L    ALT (SGPT) 31 1 - 33 U/L   Hemoglobin " A1c   Result Value Ref Range    Hemoglobin A1C 7.09 (H) 4.80 - 5.60 %   Lipid Panel   Result Value Ref Range    Total Cholesterol 175 0 - 200 mg/dL    Triglycerides 319 (H) 0 - 150 mg/dL    HDL Cholesterol 53 40 - 60 mg/dL    VLDL Cholesterol 63.8 (H) 5 - 40 mg/dL    LDL Cholesterol  58 0 - 100 mg/dL       Assessment/Plan   Diagnoses and all orders for this visit:    Type 2 diabetes mellitus with diabetic nephropathy, without long-term current use of insulin    Hyperlipidemia associated with type 2 diabetes mellitus    Essential hypertension    Pulmonary emphysema, unspecified emphysema type      Discussion/Summary  Casandra is here for routine follow up.  Her DM is not as well controlled.  She is determined to get her bs down.  She has not been good with her diet.  Her LDL is well controlled.  Her TG are high as they have been.  Advised to continue all current meds.    Return in about 6 months (around 5/15/2018) for Annual physical, with fasting labs prior.

## 2017-11-20 ENCOUNTER — TRANSCRIBE ORDERS (OUTPATIENT)
Dept: ADMINISTRATIVE | Facility: HOSPITAL | Age: 77
End: 2017-11-20

## 2017-11-20 DIAGNOSIS — Z12.31 ENCOUNTER FOR MAMMOGRAM TO ESTABLISH BASELINE MAMMOGRAM: Primary | ICD-10-CM

## 2017-12-04 RX ORDER — FENOFIBRATE 145 MG/1
TABLET, COATED ORAL
Qty: 30 TABLET | Refills: 3 | Status: SHIPPED | OUTPATIENT
Start: 2017-12-04 | End: 2018-04-28 | Stop reason: SDUPTHER

## 2017-12-04 RX ORDER — QUINAPRIL HCL AND HYDROCHLOROTHIAZIDE 20; 12.5 MG/1; MG/1
TABLET ORAL
Qty: 60 TABLET | Refills: 3 | Status: SHIPPED | OUTPATIENT
Start: 2017-12-04 | End: 2018-04-28 | Stop reason: SDUPTHER

## 2017-12-04 RX ORDER — OMEGA-3-ACID ETHYL ESTERS 1 G/1
CAPSULE, LIQUID FILLED ORAL
Qty: 120 CAPSULE | Refills: 3 | Status: SHIPPED | OUTPATIENT
Start: 2017-12-04 | End: 2018-06-19 | Stop reason: SDUPTHER

## 2017-12-16 ENCOUNTER — HOSPITAL ENCOUNTER (OUTPATIENT)
Dept: MAMMOGRAPHY | Facility: HOSPITAL | Age: 77
Discharge: HOME | End: 2017-12-16
Admitting: INTERNAL MEDICINE

## 2017-12-16 DIAGNOSIS — Z12.31 ENCOUNTER FOR MAMMOGRAM TO ESTABLISH BASELINE MAMMOGRAM: ICD-10-CM

## 2017-12-16 PROCEDURE — 77063 BREAST TOMOSYNTHESIS BI: CPT

## 2017-12-16 PROCEDURE — G0202 SCR MAMMO BI INCL CAD: HCPCS

## 2018-01-15 ENCOUNTER — DOCUMENTATION (OUTPATIENT)
Dept: PHYSICAL THERAPY | Facility: HOSPITAL | Age: 78
End: 2018-01-15

## 2018-01-15 DIAGNOSIS — G89.29 CHRONIC RIGHT SHOULDER PAIN: Primary | ICD-10-CM

## 2018-01-15 DIAGNOSIS — M25.511 CHRONIC RIGHT SHOULDER PAIN: Primary | ICD-10-CM

## 2018-01-15 NOTE — THERAPY DISCHARGE NOTE
Outpatient Physical Therapy Discharge Summary         Patient Name: Casandra Kevin  : 1940  MRN: [id number]    Today's Date: 1/15/2018    Visit Dx:    ICD-10-CM ICD-9-CM   1. Chronic right shoulder pain M25.511 719.41    G89.29 338.29             PT OP Goals       01/15/18 1700       PT Short Term Goals    STG Date to Achieve 17  -CN     STG 1 Pt will demonstrate understanding and compliance with initial HEP.  -CN     STG 1 Progress Met  -CN     STG 2 Pt will demonstrate full PROM without exacerbation of pain.  -CN     STG 2 Progress Partially Met  -CN     STG 2 Progress Comments Pt with improved functional ROM, however did not remeasure at last treatment session as pt self D/C.   -CN     STG 3 Pt will report reduced pain at worse from 6/10 to less than or equal to 4/10 with use of ice and pain management strategies.  -CN     STG 3 Progress Met  -CN     Long Term Goals    LTG Date to Achieve 17  -CN     LTG 1 Pt will report overall improved function evidenced by reduced DASH disability score from 15% to 5% or less.  -CN     LTG 1 Progress Not Met  -CN     LTG 1 Progress Comments Pt self D/C, did not reassess at last appointment.   -CN     LTG 2 Pt will demonstrate RUE AROM flexion and abduction to 160 deg or better.  -CN     LTG 2 Progress Not Met  -CN     LTG 2 Progress Comments Improved ROM, however did not remeasure at last visit as pt self D/C from PT.   -CN     LTG 3 Pt will demonstrate RUE FIR to L1 to improve her ability to dress.  -CN     LTG 3 Progress Partially Met  -CN     LTG 3 Progress Comments Pt with improved functional IR required to perform ADLs.   -CN       User Key  (r) = Recorded By, (t) = Taken By, (c) = Cosigned By    Initials Name Provider Type    NELLIE Nur, PT Physical Therapist          OP PT Discharge Summary  Date of Discharge: 01/15/18  Reason for Discharge: other (comment), Maximum functional potential achieved (Pt self D/C to independent  HEP)  Outcomes Achieved: Patient able to partially acheive established goals  Discharge Destination: Home with home program  Discharge Instructions: Pt attended 13 skilled therapy sessions for treatment of R shoulder pain. Pt with improved ROM and function and advised to continue with HEP to maintain therapy gains.       Time Calculation:                    Julita Nur, PT  1/15/2018

## 2018-03-27 RX ORDER — EZETIMIBE 10 MG/1
TABLET ORAL
Qty: 30 TABLET | Refills: 3 | Status: SHIPPED | OUTPATIENT
Start: 2018-03-27 | End: 2018-07-26 | Stop reason: SDUPTHER

## 2018-03-27 RX ORDER — ATORVASTATIN CALCIUM 10 MG/1
TABLET, FILM COATED ORAL
Qty: 30 TABLET | Refills: 3 | Status: SHIPPED | OUTPATIENT
Start: 2018-03-27 | End: 2018-06-25 | Stop reason: SDUPTHER

## 2018-03-27 RX ORDER — AMLODIPINE BESYLATE 10 MG/1
TABLET ORAL
Qty: 30 TABLET | Refills: 3 | Status: SHIPPED | OUTPATIENT
Start: 2018-03-27 | End: 2018-07-26 | Stop reason: SDUPTHER

## 2018-03-27 RX ORDER — METOPROLOL TARTRATE 50 MG/1
TABLET, FILM COATED ORAL
Qty: 60 TABLET | Refills: 3 | Status: SHIPPED | OUTPATIENT
Start: 2018-03-27 | End: 2018-07-26 | Stop reason: SDUPTHER

## 2018-04-30 RX ORDER — FENOFIBRATE 145 MG/1
TABLET, COATED ORAL
Qty: 30 TABLET | Refills: 2 | Status: SHIPPED | OUTPATIENT
Start: 2018-04-30 | End: 2018-07-26 | Stop reason: SDUPTHER

## 2018-04-30 RX ORDER — QUINAPRIL HCL AND HYDROCHLOROTHIAZIDE 20; 12.5 MG/1; MG/1
TABLET ORAL
Qty: 60 TABLET | Refills: 2 | Status: SHIPPED | OUTPATIENT
Start: 2018-04-30 | End: 2018-06-25 | Stop reason: SDUPTHER

## 2018-06-19 RX ORDER — OMEGA-3-ACID ETHYL ESTERS 1 G/1
CAPSULE, LIQUID FILLED ORAL
Qty: 120 CAPSULE | Refills: 3 | Status: SHIPPED | OUTPATIENT
Start: 2018-06-19 | End: 2018-10-01 | Stop reason: SDUPTHER

## 2018-06-25 RX ORDER — ATORVASTATIN CALCIUM 10 MG/1
10 TABLET, FILM COATED ORAL DAILY
Qty: 90 TABLET | Refills: 3 | Status: SHIPPED | OUTPATIENT
Start: 2018-06-25 | End: 2019-07-08 | Stop reason: SDUPTHER

## 2018-06-25 RX ORDER — QUINAPRIL HCL AND HYDROCHLOROTHIAZIDE 20; 12.5 MG/1; MG/1
2 TABLET ORAL DAILY
Qty: 180 TABLET | Refills: 2 | Status: SHIPPED | OUTPATIENT
Start: 2018-06-25 | End: 2019-04-01 | Stop reason: SDUPTHER

## 2018-07-26 RX ORDER — METOPROLOL TARTRATE 50 MG/1
TABLET, FILM COATED ORAL
Qty: 60 TABLET | Refills: 3 | Status: SHIPPED | OUTPATIENT
Start: 2018-07-26 | End: 2018-12-21 | Stop reason: SDUPTHER

## 2018-07-26 RX ORDER — AMLODIPINE BESYLATE 10 MG/1
TABLET ORAL
Qty: 30 TABLET | Refills: 3 | Status: SHIPPED | OUTPATIENT
Start: 2018-07-26 | End: 2018-10-01 | Stop reason: SDUPTHER

## 2018-07-26 RX ORDER — EZETIMIBE 10 MG/1
TABLET ORAL
Qty: 30 TABLET | Refills: 3 | Status: SHIPPED | OUTPATIENT
Start: 2018-07-26 | End: 2018-10-01 | Stop reason: SDUPTHER

## 2018-07-26 RX ORDER — FENOFIBRATE 145 MG/1
TABLET, COATED ORAL
Qty: 30 TABLET | Refills: 3 | Status: SHIPPED | OUTPATIENT
Start: 2018-07-26 | End: 2018-10-01 | Stop reason: SDUPTHER

## 2018-08-07 DIAGNOSIS — E11.21 TYPE 2 DIABETES MELLITUS WITH DIABETIC NEPHROPATHY, WITHOUT LONG-TERM CURRENT USE OF INSULIN (HCC): ICD-10-CM

## 2018-08-07 DIAGNOSIS — I10 ESSENTIAL HYPERTENSION: ICD-10-CM

## 2018-08-07 DIAGNOSIS — E11.69 HYPERLIPIDEMIA ASSOCIATED WITH TYPE 2 DIABETES MELLITUS (HCC): ICD-10-CM

## 2018-08-07 DIAGNOSIS — E78.5 HYPERLIPIDEMIA ASSOCIATED WITH TYPE 2 DIABETES MELLITUS (HCC): ICD-10-CM

## 2018-08-07 DIAGNOSIS — E55.9 VITAMIN D DEFICIENCY: ICD-10-CM

## 2018-08-07 DIAGNOSIS — Z00.00 HEALTH CARE MAINTENANCE: Primary | ICD-10-CM

## 2018-08-08 LAB
25(OH)D3+25(OH)D2 SERPL-MCNC: 37.3 NG/ML (ref 30–100)
ALBUMIN SERPL-MCNC: 4.7 G/DL (ref 3.5–5.2)
ALBUMIN/GLOB SERPL: 2.2 G/DL
ALP SERPL-CCNC: 61 U/L (ref 39–117)
ALT SERPL-CCNC: 40 U/L (ref 1–33)
AST SERPL-CCNC: 25 U/L (ref 1–32)
BASOPHILS # BLD AUTO: 0.07 10*3/MM3 (ref 0–0.2)
BASOPHILS NFR BLD AUTO: 0.9 % (ref 0–1.5)
BILIRUB SERPL-MCNC: 0.2 MG/DL (ref 0.1–1.2)
BUN SERPL-MCNC: 14 MG/DL (ref 8–23)
BUN/CREAT SERPL: 17.1 (ref 7–25)
CALCIUM SERPL-MCNC: 9.8 MG/DL (ref 8.6–10.5)
CHLORIDE SERPL-SCNC: 99 MMOL/L (ref 98–107)
CHOLEST SERPL-MCNC: 164 MG/DL (ref 0–200)
CO2 SERPL-SCNC: 29.8 MMOL/L (ref 22–29)
CREAT SERPL-MCNC: 0.82 MG/DL (ref 0.57–1)
EOSINOPHIL # BLD AUTO: 0.32 10*3/MM3 (ref 0–0.7)
EOSINOPHIL NFR BLD AUTO: 4.2 % (ref 0.3–6.2)
ERYTHROCYTE [DISTWIDTH] IN BLOOD BY AUTOMATED COUNT: 14.2 % (ref 11.7–13)
GLOBULIN SER CALC-MCNC: 2.1 GM/DL
GLUCOSE SERPL-MCNC: 151 MG/DL (ref 65–99)
HBA1C MFR BLD: 7.2 % (ref 4.8–5.6)
HCT VFR BLD AUTO: 42 % (ref 35.6–45.5)
HDLC SERPL-MCNC: 48 MG/DL (ref 40–60)
HGB BLD-MCNC: 13.4 G/DL (ref 11.9–15.5)
IMM GRANULOCYTES # BLD: 0.01 10*3/MM3 (ref 0–0.03)
IMM GRANULOCYTES NFR BLD: 0.1 % (ref 0–0.5)
LDLC SERPL CALC-MCNC: 51 MG/DL (ref 0–100)
LYMPHOCYTES # BLD AUTO: 3.14 10*3/MM3 (ref 0.9–4.8)
LYMPHOCYTES NFR BLD AUTO: 41.3 % (ref 19.6–45.3)
MCH RBC QN AUTO: 29.3 PG (ref 26.9–32)
MCHC RBC AUTO-ENTMCNC: 31.9 G/DL (ref 32.4–36.3)
MCV RBC AUTO: 91.7 FL (ref 80.5–98.2)
MONOCYTES # BLD AUTO: 0.67 10*3/MM3 (ref 0.2–1.2)
MONOCYTES NFR BLD AUTO: 8.8 % (ref 5–12)
NEUTROPHILS # BLD AUTO: 3.41 10*3/MM3 (ref 1.9–8.1)
NEUTROPHILS NFR BLD AUTO: 44.8 % (ref 42.7–76)
PLATELET # BLD AUTO: 245 10*3/MM3 (ref 140–500)
POTASSIUM SERPL-SCNC: 4 MMOL/L (ref 3.5–5.2)
PROT SERPL-MCNC: 6.8 G/DL (ref 6–8.5)
RBC # BLD AUTO: 4.58 10*6/MM3 (ref 3.9–5.2)
SODIUM SERPL-SCNC: 143 MMOL/L (ref 136–145)
T4 FREE SERPL-MCNC: 1.2 NG/DL (ref 0.93–1.7)
TRIGL SERPL-MCNC: 327 MG/DL (ref 0–150)
TSH SERPL DL<=0.005 MIU/L-ACNC: 4.82 MIU/ML (ref 0.27–4.2)
VLDLC SERPL CALC-MCNC: 65.4 MG/DL (ref 5–40)
WBC # BLD AUTO: 7.61 10*3/MM3 (ref 4.5–10.7)

## 2018-08-11 LAB
ALBUMIN/CREAT UR: 22.1 MG/G CREAT (ref 0–30)
APPEARANCE UR: CLEAR
BACTERIA #/AREA URNS HPF: NORMAL /HPF
BACTERIA UR CULT: NORMAL
BACTERIA UR CULT: NORMAL
BILIRUB UR QL STRIP: NEGATIVE
COLOR UR: YELLOW
CREAT UR-MCNC: 43.5 MG/DL
EPI CELLS #/AREA URNS HPF: NORMAL /HPF
GLUCOSE UR QL: ABNORMAL
HGB UR QL STRIP: NEGATIVE
KETONES UR QL STRIP: NEGATIVE
LEUKOCYTE ESTERASE UR QL STRIP: ABNORMAL
MICRO URNS: ABNORMAL
MICROALBUMIN UR-MCNC: 9.6 UG/ML
MUCOUS THREADS URNS QL MICRO: PRESENT /HPF
NITRITE UR QL STRIP: NEGATIVE
PH UR STRIP: 6 [PH] (ref 5–7.5)
PROT UR QL STRIP: NEGATIVE
RBC #/AREA URNS HPF: NORMAL /HPF
SP GR UR: 1.02 (ref 1–1.03)
URINALYSIS REFLEX: ABNORMAL
UROBILINOGEN UR STRIP-MCNC: 0.2 MG/DL (ref 0.2–1)
WBC #/AREA URNS HPF: NORMAL /HPF

## 2018-08-14 ENCOUNTER — OFFICE VISIT (OUTPATIENT)
Dept: INTERNAL MEDICINE | Facility: CLINIC | Age: 78
End: 2018-08-14

## 2018-08-14 VITALS
DIASTOLIC BLOOD PRESSURE: 80 MMHG | BODY MASS INDEX: 33.91 KG/M2 | HEART RATE: 73 BPM | WEIGHT: 211 LBS | SYSTOLIC BLOOD PRESSURE: 130 MMHG | RESPIRATION RATE: 12 BRPM | TEMPERATURE: 98.4 F | OXYGEN SATURATION: 96 % | HEIGHT: 66 IN

## 2018-08-14 DIAGNOSIS — Z00.00 HEALTH MAINTENANCE EXAMINATION: Primary | ICD-10-CM

## 2018-08-14 DIAGNOSIS — E78.5 HYPERLIPIDEMIA ASSOCIATED WITH TYPE 2 DIABETES MELLITUS (HCC): ICD-10-CM

## 2018-08-14 DIAGNOSIS — R07.9 CHEST PAIN, UNSPECIFIED TYPE: ICD-10-CM

## 2018-08-14 DIAGNOSIS — E11.69 HYPERLIPIDEMIA ASSOCIATED WITH TYPE 2 DIABETES MELLITUS (HCC): ICD-10-CM

## 2018-08-14 DIAGNOSIS — E04.1 THYROID NODULE: ICD-10-CM

## 2018-08-14 DIAGNOSIS — E03.9 ACQUIRED HYPOTHYROIDISM: ICD-10-CM

## 2018-08-14 DIAGNOSIS — R13.14 PHARYNGOESOPHAGEAL DYSPHAGIA: ICD-10-CM

## 2018-08-14 DIAGNOSIS — Z00.00 MEDICARE ANNUAL WELLNESS VISIT, SUBSEQUENT: ICD-10-CM

## 2018-08-14 DIAGNOSIS — E11.21 TYPE 2 DIABETES MELLITUS WITH DIABETIC NEPHROPATHY, WITHOUT LONG-TERM CURRENT USE OF INSULIN (HCC): ICD-10-CM

## 2018-08-14 DIAGNOSIS — I10 ESSENTIAL HYPERTENSION: ICD-10-CM

## 2018-08-14 PROCEDURE — G0439 PPPS, SUBSEQ VISIT: HCPCS | Performed by: INTERNAL MEDICINE

## 2018-08-14 PROCEDURE — 99397 PER PM REEVAL EST PAT 65+ YR: CPT | Performed by: INTERNAL MEDICINE

## 2018-08-14 RX ORDER — LEVOTHYROXINE SODIUM 0.03 MG/1
25 TABLET ORAL DAILY
Qty: 30 TABLET | Refills: 6 | Status: SHIPPED | OUTPATIENT
Start: 2018-08-14 | End: 2019-01-10

## 2018-08-14 NOTE — PROGRESS NOTES
"Medicare Annual Wellness Visit & CPE    Chief Complaint:  Annual Exam (AWV); Hypertension; Hyperlipidemia; and Diabetes      History of Present Illness    Casandra Kevin is a 77 y.o. female who presents for an Annual Wellness Visit & CPE. In addition, we addressed the following health issues:    Mammo: 12/16/17  DEXA: 3/3/16  C-scope: 10/28/14 due 2019  Flu shot:not yet  Dental Exam: q6mo  Eye Exam: May and November  Exercise: \"probably not as much as she should\".  She lives in a three story house and uses goes up and down the stairs many times a day.    Advanced Care Planning:  has NO advance directive - not interested in additional information   Immunization History   Administered Date(s) Administered   • Hepatitis A 05/23/2018   • Pneumococcal Conjugate 13-Valent (PCV13) 08/11/2015   • Pneumococcal Polysaccharide (PPSV23) 01/01/2006   • Tdap 08/23/2016   • Zostavax 01/01/2014     Casandra is here for follow up on her HTN, HLD, DM.  Her knees given her some pain these days but she thinks it's just age.  She did have an episode of chest pain while in [place] several months back.  It came and went over 3-4 days.  She does have occ SOA.  She has not had any further chest pains since she returned home from [place].      Review of Systems   Constitution: Negative for chills, fever and malaise/fatigue.   HENT: Negative for hearing loss, hoarse voice and sore throat.    Eyes: Negative for blurred vision, double vision and visual disturbance.   Cardiovascular: Positive for chest pain (she has had a few chest pains. midsternal.  it was intermittent for three to four days.  she was in [place] and did not seek any treatment.  she attributed this to falling on her back several days prior). Negative for leg swelling and palpitations.   Respiratory: Positive for cough (chronic) and shortness of breath (occ).    Endocrine: Negative for polydipsia and polyuria.   Hematologic/Lymphatic: Does not bruise/bleed easily.   Skin: Negative " for rash and suspicious lesions.   Musculoskeletal: Negative for arthritis and myalgias.   Gastrointestinal: Positive for diarrhea (comes and goes). Negative for bloating, abdominal pain, change in bowel habit, constipation, dysphagia, hematochezia, melena, nausea and vomiting.   Genitourinary: Positive for bladder incontinence (minor). Negative for dysuria and hematuria.   Neurological: Negative for dizziness, headaches and light-headedness.   Psychiatric/Behavioral: Negative for depression. The patient is not nervous/anxious.        Past Medical History:   Diagnosis Date   • COPD (chronic obstructive pulmonary disease) (CMS/[facility])    • Diabetes mellitus (CMS/[facility])    • Hyperlipidemia    • Hypertension        Past Surgical History:   Procedure Laterality Date   • APPENDECTOMY     • CHOLECYSTECTOMY     • TONSILLECTOMY         Social History     Social History   • Marital status:      Spouse name: N/A   • Number of children: 3   • Years of education: N/A     Occupational History   • retired teacher/principle      Social History Main Topics   • Smoking status: Former Smoker     Years: 3.00   • Smokeless tobacco: Never Used      Comment: from [age]   • Alcohol use No   • Drug use: No   • Sexual activity: Not on file     Other Topics Concern   • Not on file     Social History Narrative   • No narrative on file       Family History   Problem Relation Age of Onset   • Aneurysm Mother    • Hyperlipidemia Mother    • Cancer Father         throat secondary to smoking   • Coronary artery disease Brother    • Colon cancer Brother        Allergies   Allergen Reactions   • Codeine        Current Outpatient Prescriptions on File Prior to Visit   Medication Sig Dispense Refill   • albuterol (PROVENTIL HFA;VENTOLIN HFA) 108 (90 BASE) MCG/ACT inhaler every 4 (four) hours.     • amLODIPine (NORVASC) 10 MG tablet take 1 tablet by mouth once daily 30 tablet 3   • ascorbic acid (VITAMIN C) 1000 MG tablet Take by mouth.     •  atorvastatin (LIPITOR) 10 MG tablet Take 1 tablet by mouth Daily. 90 tablet 3   • budesonide-formoterol (SYMBICORT) 160-4.5 MCG/ACT inhaler 2 (two) times a day.     • Cholecalciferol (VITAMIN D) 2000 UNITS capsule Take by mouth.     • clopidogrel (PLAVIX) 75 MG tablet Take by mouth.     • ezetimibe (ZETIA) 10 MG tablet take 1 tablet by mouth once daily 30 tablet 3   • fenofibrate (TRICOR) 145 MG tablet take 1 tablet by mouth once daily 30 tablet 3   • metoprolol tartrate (LOPRESSOR) 50 MG tablet take 1 tablet by mouth twice a day 60 tablet 3   • omega-3 acid ethyl esters (LOVAZA) 1 g capsule take 4 capsules by mouth once daily 120 capsule 3   • quinapril-hydrochlorothiazide (ACCURETIC) 20-12.5 MG per tablet Take 2 tablets by mouth Daily. 180 tablet 2   • sitaGLIPtin-metFORMIN (JANUMET)  MG per tablet Take 1 tablet by mouth 2 (Two) Times a Day With Meals. 180 tablet 3   • vitamin B-12 (CYANOCOBALAMIN) 500 MCG tablet Take by mouth.       No current facility-administered medications on file prior to visit.        Patient Active Problem List   Diagnosis   • Amaurosis fugax   • Stenosis of carotid artery   • Chronic obstructive pulmonary disease (CMS/HCC)   • Complication of diabetes mellitus (CMS/HCC)   • Hyperlipidemia associated with type 2 diabetes mellitus (CMS/HCC)   • Essential hypertension   • Diastolic dysfunction   • Adiposity   • Thyroid nodule   • Temporary cerebral vascular dysfunction   • Type 2 diabetes mellitus (CMS/HCC)   • Vitamin D deficiency   • Microscopic hematuria   • Acute pain of right shoulder   • Acquired hypothyroidism       Health Risk Assessment/Depression Screen/Functional and Cognitive Screening:   The patient has completed a Health Risk Assessment & Depression screen. These have been reviewed with them and have been scanned as a separate documents.    Age-appropriate Screening Schedule:  Refer to the list below for future screening recommendations based on patient's age. Orders for  "these recommended tests are listed in the plan section. The patient has been provided with a written plan.     I have reviewed their problem list, past medical history, family history, social history, and surgical history.     Vitals:    08/14/18 1018   BP: 130/80   Pulse: 73   Resp: 12   Temp: 98.4 °F (36.9 °C)   SpO2: 96%   Weight: 95.7 kg (211 lb)   Height: 167.6 cm (66\")   PainSc: 0-No pain       Body mass index is 34.06 kg/m².    Physical Exam   Constitutional: She is oriented to person, place, and time. She appears well-developed and well-nourished. No distress.   HENT:   Head: Normocephalic and atraumatic.   Nose: Nose normal.   Mouth/Throat: Oropharynx is clear and moist.   Eyes: Pupils are equal, round, and reactive to light. Conjunctivae and EOM are normal. No scleral icterus.   Neck: Normal range of motion. Neck supple. No thyromegaly present.   Cardiovascular: Normal rate, regular rhythm and normal heart sounds.  Exam reveals no gallop and no friction rub.    No murmur heard.  Pulses:       Carotid pulses are 2+ on the right side, and 2+ on the left side.       Femoral pulses are 2+ on the right side, and 2+ on the left side.       Dorsalis pedis pulses are 2+ on the right side, and 2+ on the left side.        Posterior tibial pulses are 2+ on the right side, and 2+ on the left side.   Pulmonary/Chest: Effort normal and breath sounds normal. No respiratory distress. She has no wheezes. She has no rales. Right breast exhibits no mass and no nipple discharge. Left breast exhibits no mass and no nipple discharge.   Abdominal: Soft. Bowel sounds are normal. She exhibits no distension and no mass. There is no tenderness.   Musculoskeletal: Normal range of motion. She exhibits no edema.    Casandra had a diabetic foot exam performed today.   During the foot exam she had a monofilament test performed (normal in all six areas hardik).  Vascular Status -  Her right foot exhibits normal foot vasculature  and no edema. " Her left foot exhibits normal foot vasculature  and no edema.  Skin Integrity  -  Her right foot skin is intact.Her left foot skin is intact..  Lymphadenopathy:     She has no cervical adenopathy.     She has no axillary adenopathy.        Right: No inguinal and no supraclavicular adenopathy present.        Left: No inguinal and no supraclavicular adenopathy present.   Neurological: She is alert and oriented to person, place, and time. She has normal reflexes. No cranial nerve deficit.   Skin: Skin is warm and dry.   Psychiatric: She has a normal mood and affect. Her speech is normal and behavior is normal. Judgment and thought content normal. Cognition and memory are normal.   Vitals reviewed.      Results for orders placed or performed in visit on 08/07/18   Urine culture, Comprehensive   Result Value Ref Range    Urine Culture Final report     Result 1 Comment    Comprehensive Metabolic Panel   Result Value Ref Range    Glucose 151 (H) 65 - 99 mg/dL    BUN 14 8 - 23 mg/dL    Creatinine 0.82 0.57 - 1.00 mg/dL    eGFR Non African Am 68 >60 mL/min/1.73    eGFR African Am 82 >60 mL/min/1.73    BUN/Creatinine Ratio 17.1 7.0 - 25.0    Sodium 143 136 - 145 mmol/L    Potassium 4.0 3.5 - 5.2 mmol/L    Chloride 99 98 - 107 mmol/L    Total CO2 29.8 (H) 22.0 - 29.0 mmol/L    Calcium 9.8 8.6 - 10.5 mg/dL    Total Protein 6.8 6.0 - 8.5 g/dL    Albumin 4.70 3.50 - 5.20 g/dL    Globulin 2.1 gm/dL    A/G Ratio 2.2 g/dL    Total Bilirubin 0.2 0.1 - 1.2 mg/dL    Alkaline Phosphatase 61 39 - 117 U/L    AST (SGOT) 25 1 - 32 U/L    ALT (SGPT) 40 (H) 1 - 33 U/L   Lipid Panel   Result Value Ref Range    Total Cholesterol 164 0 - 200 mg/dL    Triglycerides 327 (H) 0 - 150 mg/dL    HDL Cholesterol 48 40 - 60 mg/dL    VLDL Cholesterol 65.4 (H) 5 - 40 mg/dL    LDL Cholesterol  51 0 - 100 mg/dL   TSH Rfx On Abnormal To Free T4   Result Value Ref Range    TSH 4.82 (H) 0.27 - 4.2 mIU/mL   Vitamin D 25 Hydroxy   Result Value Ref Range    25  Hydroxy, Vitamin D 37.3 30.0 - 100.0 ng/mL   Hemoglobin A1c   Result Value Ref Range    Hemoglobin A1C 7.20 (H) 4.80 - 5.60 %   Urinalysis With Culture If Indicated - Urine, Clean Catch   Result Value Ref Range    Specific Gravity, UA 1.017 1.005 - 1.030    pH, UA 6.0 5.0 - 7.5    Color, UA Yellow Yellow    Appearance, UA Clear Clear    Leukocytes, UA Trace (A) Negative    Protein Negative Negative/Trace    Glucose, UA Trace (A) Negative    Ketones Negative Negative    Blood, UA Negative Negative    Bilirubin, UA Negative Negative    Urobilinogen, UA 0.2 0.2 - 1.0 mg/dL    Nitrite, UA Negative Negative    Microscopic Examination See below:     Urinalysis Reflex Comment    Microalbumin / Creatinine Urine Ratio - Urine, Clean Catch   Result Value Ref Range    Creatinine, Urine 43.5 Not Estab. mg/dL    Microalbumin, Urine 9.6 Not Estab. ug/mL    Microalbumin/Creatinine Ratio 22.1 0.0 - 30.0 mg/g creat   T4F   Result Value Ref Range    Free T4 1.20 0.93 - 1.70 ng/dL   Microscopic Examination   Result Value Ref Range    WBC, UA 0-5 0 - 5 /hpf    RBC, UA 0-2 0 - 2 /hpf    Epithelial Cells (non renal) 0-10 0 - 10 /hpf    Mucus, UA Present Not Estab. /hpf    Bacteria, UA None seen None seen/Few /hpf   CBC & Differential   Result Value Ref Range    WBC 7.61 4.50 - 10.70 10*3/mm3    RBC 4.58 3.90 - 5.20 10*6/mm3    Hemoglobin 13.4 11.9 - 15.5 g/dL    Hematocrit 42.0 35.6 - 45.5 %    MCV 91.7 80.5 - 98.2 fL    MCH 29.3 26.9 - 32.0 pg    MCHC 31.9 (L) 32.4 - 36.3 g/dL    RDW 14.2 (H) 11.7 - 13.0 %    Platelets 245 140 - 500 10*3/mm3    Neutrophil Rel % 44.8 42.7 - 76.0 %    Lymphocyte Rel % 41.3 19.6 - 45.3 %    Monocyte Rel % 8.8 5.0 - 12.0 %    Eosinophil Rel % 4.2 0.3 - 6.2 %    Basophil Rel % 0.9 0.0 - 1.5 %    Neutrophils Absolute 3.41 1.90 - 8.10 10*3/mm3    Lymphocytes Absolute 3.14 0.90 - 4.80 10*3/mm3    Monocytes Absolute 0.67 0.20 - 1.20 10*3/mm3    Eosinophils Absolute 0.32 0.00 - 0.70 10*3/mm3    Basophils Absolute  0.07 0.00 - 0.20 10*3/mm3    Immature Granulocyte Rel % 0.1 0.0 - 0.5 %    Immature Grans Absolute 0.01 0.00 - 0.03 10*3/mm3       Assessment & Plan:    Diagnoses and all orders for this visit:    Medicare annual wellness visit, subsequent    Hyperlipidemia associated with type 2 diabetes mellitus (CMS/[facility])    Essential hypertension    Type 2 diabetes mellitus with diabetic nephropathy, without long-term current use of insulin (CMS/[facility])    Chest pain, unspecified type  -     Treadmill Stress Test; Future    Acquired hypothyroidism    Thyroid nodule  -     US Thyroid; Future    Pharyngoesophageal dysphagia  -     US Thyroid; Future    Other orders  -     levothyroxine (SYNTHROID, LEVOTHROID) 25 MCG tablet; Take 1 tablet by mouth Daily.        Discussion/Summary  Fabi is here for routine AWV and CPE.  She is up to date on most health maintenance.  She will get a DEXA in 6 weeks with her follow up appt/labs.  Her bp has been running high at home.  She does not limit salt and admittedly really eats too much salt.  I have asked her to really restrict this.  I would like to see her exercise more.   Her TSH has remained marginally elevated over the last year.  I am going to start her on 25 mcg of levothyroxine and see if this helps her bp.  She has also had some food sticking in her throat.  She had a thyroid u/s last year.  We will repeat this as well to make sure the nodules aren't growing.  Given the chest pain and her DM and known carotid disease, we are going to get a stress test.  Her A1c has increased.  She has not been limiting sugars.  Drinking sweet tea all summer.  I have asked her to really reign in her diet.  She needs to get a much better handle on all of this.      Follow Up:  Return in about 6 weeks (around 9/25/2018) for Next scheduled follow up, with nonfasting labs prior, with dexa prior to appt.     An After Visit Summary and PPPS with all of these plans were given to the patient.

## 2018-08-16 ENCOUNTER — TELEPHONE (OUTPATIENT)
Dept: INTERNAL MEDICINE | Facility: CLINIC | Age: 78
End: 2018-08-16

## 2018-08-16 NOTE — TELEPHONE ENCOUNTER
Pt informed, via voicemail, to call [facility] to see what they recommend her to stop taking.     Also informed where to go for thyroid u/s.

## 2018-08-16 NOTE — TELEPHONE ENCOUNTER
Pt is going to have her stress test done soon and wants to know whether or not she needs to be off her med or not.     (When I call pt back I will also let her know where she needs to go for thyroid us)

## 2018-08-16 NOTE — TELEPHONE ENCOUNTER
Normally [facility] will let the patient know if they need to stop a certain medication.  I would have her call there to find out.  Or our office can try to call up there tomorrow and see if any meds need to be stopped prior to the stress test.

## 2018-08-21 ENCOUNTER — HOSPITAL ENCOUNTER (OUTPATIENT)
Dept: CARDIOLOGY | Facility: HOSPITAL | Age: 78
Discharge: HOME | End: 2018-08-21
Admitting: INTERNAL MEDICINE

## 2018-08-21 ENCOUNTER — HOSPITAL ENCOUNTER (OUTPATIENT)
Dept: ULTRASOUND IMAGING | Facility: HOSPITAL | Age: 78
Discharge: HOME | End: 2018-08-21

## 2018-08-21 DIAGNOSIS — R07.9 CHEST PAIN, UNSPECIFIED TYPE: ICD-10-CM

## 2018-08-21 DIAGNOSIS — R13.14 PHARYNGOESOPHAGEAL DYSPHAGIA: ICD-10-CM

## 2018-08-21 DIAGNOSIS — E04.1 THYROID NODULE: ICD-10-CM

## 2018-08-21 LAB
BH CV STRESS BP STAGE 1: NORMAL
BH CV STRESS BP STAGE 2: NORMAL
BH CV STRESS DURATION MIN STAGE 1: 3
BH CV STRESS DURATION MIN STAGE 2: 3
BH CV STRESS DURATION SEC STAGE 1: 0
BH CV STRESS DURATION SEC STAGE 2: 0
BH CV STRESS GRADE STAGE 1: 10
BH CV STRESS GRADE STAGE 2: 12
BH CV STRESS HR STAGE 1: 107
BH CV STRESS HR STAGE 2: 130
BH CV STRESS METS STAGE 1: 5
BH CV STRESS METS STAGE 2: 7.5
BH CV STRESS PROTOCOL 1: NORMAL
BH CV STRESS RECOVERY BP: NORMAL MMHG
BH CV STRESS RECOVERY HR: 79 BPM
BH CV STRESS SPEED STAGE 1: 1.7
BH CV STRESS SPEED STAGE 2: 2.5
BH CV STRESS STAGE 1: 1
BH CV STRESS STAGE 2: 2
MAXIMAL PREDICTED HEART RATE: 143 BPM
PERCENT MAX PREDICTED HR: 90.91 %
STRESS BASELINE BP: NORMAL MMHG
STRESS BASELINE HR: 70 BPM
STRESS PERCENT HR: 107 %
STRESS POST ESTIMATED WORKLOAD: 5.9 METS
STRESS POST EXERCISE DUR MIN: 4 MIN
STRESS POST EXERCISE DUR SEC: 30 SEC
STRESS POST PEAK BP: NORMAL MMHG
STRESS POST PEAK HR: 130 BPM
STRESS TARGET HR: 122 BPM

## 2018-08-21 PROCEDURE — 93017 CV STRESS TEST TRACING ONLY: CPT

## 2018-08-21 PROCEDURE — 76536 US EXAM OF HEAD AND NECK: CPT

## 2018-08-21 PROCEDURE — 93018 CV STRESS TEST I&R ONLY: CPT | Performed by: INTERNAL MEDICINE

## 2018-08-21 PROCEDURE — 93016 CV STRESS TEST SUPVJ ONLY: CPT | Performed by: INTERNAL MEDICINE

## 2018-08-22 ENCOUNTER — TELEPHONE (OUTPATIENT)
Dept: INTERNAL MEDICINE | Facility: CLINIC | Age: 78
End: 2018-08-22

## 2018-08-22 NOTE — TELEPHONE ENCOUNTER
----- Message from Michelle Wilson MD sent at 8/22/2018 12:42 PM EDT -----  Please call - her stress test was read as normal overall despite the exercise capacity during the test being impaired.

## 2018-08-23 ENCOUNTER — TELEPHONE (OUTPATIENT)
Dept: INTERNAL MEDICINE | Facility: CLINIC | Age: 78
End: 2018-08-23

## 2018-08-23 NOTE — TELEPHONE ENCOUNTER
----- Message from Michelle Wilson MD sent at 8/23/2018  4:20 PM EDT -----  Please call - her thyroid ultrasound is stable compared to last year.

## 2018-09-12 ENCOUNTER — TELEPHONE (OUTPATIENT)
Dept: INTERNAL MEDICINE | Facility: CLINIC | Age: 78
End: 2018-09-12

## 2018-09-19 DIAGNOSIS — E11.69 HYPERLIPIDEMIA ASSOCIATED WITH TYPE 2 DIABETES MELLITUS (HCC): Primary | ICD-10-CM

## 2018-09-19 DIAGNOSIS — E55.9 VITAMIN D DEFICIENCY: ICD-10-CM

## 2018-09-19 DIAGNOSIS — I10 ESSENTIAL HYPERTENSION: ICD-10-CM

## 2018-09-19 DIAGNOSIS — E78.5 HYPERLIPIDEMIA ASSOCIATED WITH TYPE 2 DIABETES MELLITUS (HCC): Primary | ICD-10-CM

## 2018-09-19 DIAGNOSIS — E11.21 TYPE 2 DIABETES MELLITUS WITH DIABETIC NEPHROPATHY, WITHOUT LONG-TERM CURRENT USE OF INSULIN (HCC): ICD-10-CM

## 2018-09-19 DIAGNOSIS — E03.9 ACQUIRED HYPOTHYROIDISM: ICD-10-CM

## 2018-09-20 ENCOUNTER — CLINICAL SUPPORT (OUTPATIENT)
Dept: INTERNAL MEDICINE | Facility: CLINIC | Age: 78
End: 2018-09-20

## 2018-09-20 DIAGNOSIS — Z78.0 POSTMENOPAUSAL: Primary | ICD-10-CM

## 2018-09-20 LAB
ALBUMIN SERPL-MCNC: 4.8 G/DL (ref 3.5–5.2)
ALBUMIN/GLOB SERPL: 2.2 G/DL
ALP SERPL-CCNC: 62 U/L (ref 39–117)
ALT SERPL-CCNC: 33 U/L (ref 1–33)
AST SERPL-CCNC: 24 U/L (ref 1–32)
BASOPHILS # BLD AUTO: 0.04 10*3/MM3 (ref 0–0.2)
BASOPHILS NFR BLD AUTO: 0.6 % (ref 0–1.5)
BILIRUB SERPL-MCNC: 0.3 MG/DL (ref 0.1–1.2)
BUN SERPL-MCNC: 20 MG/DL (ref 8–23)
BUN/CREAT SERPL: 25 (ref 7–25)
CALCIUM SERPL-MCNC: 9.8 MG/DL (ref 8.6–10.5)
CHLORIDE SERPL-SCNC: 100 MMOL/L (ref 98–107)
CO2 SERPL-SCNC: 27.1 MMOL/L (ref 22–29)
CREAT SERPL-MCNC: 0.8 MG/DL (ref 0.57–1)
EOSINOPHIL # BLD AUTO: 0.21 10*3/MM3 (ref 0–0.7)
EOSINOPHIL NFR BLD AUTO: 3.3 % (ref 0.3–6.2)
ERYTHROCYTE [DISTWIDTH] IN BLOOD BY AUTOMATED COUNT: 14.3 % (ref 11.7–13)
GLOBULIN SER CALC-MCNC: 2.2 GM/DL
GLUCOSE SERPL-MCNC: 151 MG/DL (ref 65–99)
HBA1C MFR BLD: 6.8 % (ref 4.8–5.6)
HCT VFR BLD AUTO: 42.1 % (ref 35.6–45.5)
HGB BLD-MCNC: 13.6 G/DL (ref 11.9–15.5)
IMM GRANULOCYTES # BLD: 0.01 10*3/MM3 (ref 0–0.03)
IMM GRANULOCYTES NFR BLD: 0.2 % (ref 0–0.5)
LYMPHOCYTES # BLD AUTO: 2.98 10*3/MM3 (ref 0.9–4.8)
LYMPHOCYTES NFR BLD AUTO: 47.2 % (ref 19.6–45.3)
MCH RBC QN AUTO: 29.4 PG (ref 26.9–32)
MCHC RBC AUTO-ENTMCNC: 32.3 G/DL (ref 32.4–36.3)
MCV RBC AUTO: 91.1 FL (ref 80.5–98.2)
MONOCYTES # BLD AUTO: 0.58 10*3/MM3 (ref 0.2–1.2)
MONOCYTES NFR BLD AUTO: 9.2 % (ref 5–12)
NEUTROPHILS # BLD AUTO: 2.5 10*3/MM3 (ref 1.9–8.1)
NEUTROPHILS NFR BLD AUTO: 39.7 % (ref 42.7–76)
PLATELET # BLD AUTO: 272 10*3/MM3 (ref 140–500)
POTASSIUM SERPL-SCNC: 4 MMOL/L (ref 3.5–5.2)
PROT SERPL-MCNC: 7 G/DL (ref 6–8.5)
RBC # BLD AUTO: 4.62 10*6/MM3 (ref 3.9–5.2)
SODIUM SERPL-SCNC: 142 MMOL/L (ref 136–145)
TSH SERPL DL<=0.005 MIU/L-ACNC: 2.43 MIU/ML (ref 0.27–4.2)
WBC # BLD AUTO: 6.31 10*3/MM3 (ref 4.5–10.7)

## 2018-09-20 PROCEDURE — 77080 DXA BONE DENSITY AXIAL: CPT | Performed by: INTERNAL MEDICINE

## 2018-10-01 RX ORDER — EZETIMIBE 10 MG/1
TABLET ORAL
Qty: 90 TABLET | Refills: 1 | Status: SHIPPED | OUTPATIENT
Start: 2018-10-01 | End: 2018-10-03

## 2018-10-01 RX ORDER — FENOFIBRATE 145 MG/1
TABLET, COATED ORAL
Qty: 90 TABLET | Refills: 1 | Status: SHIPPED | OUTPATIENT
Start: 2018-10-01 | End: 2019-03-24 | Stop reason: SDUPTHER

## 2018-10-01 RX ORDER — OMEGA-3-ACID ETHYL ESTERS 1 G/1
CAPSULE, LIQUID FILLED ORAL
Qty: 360 CAPSULE | Refills: 0 | Status: SHIPPED | OUTPATIENT
Start: 2018-10-01 | End: 2018-12-27 | Stop reason: SDUPTHER

## 2018-10-01 RX ORDER — AMLODIPINE BESYLATE 10 MG/1
TABLET ORAL
Qty: 90 TABLET | Refills: 1 | Status: SHIPPED | OUTPATIENT
Start: 2018-10-01 | End: 2019-03-24 | Stop reason: SDUPTHER

## 2018-10-03 ENCOUNTER — OFFICE VISIT (OUTPATIENT)
Dept: INTERNAL MEDICINE | Facility: CLINIC | Age: 78
End: 2018-10-03

## 2018-10-03 VITALS
SYSTOLIC BLOOD PRESSURE: 122 MMHG | RESPIRATION RATE: 18 BRPM | DIASTOLIC BLOOD PRESSURE: 74 MMHG | HEART RATE: 67 BPM | WEIGHT: 199 LBS | HEIGHT: 66 IN | OXYGEN SATURATION: 98 % | BODY MASS INDEX: 31.98 KG/M2

## 2018-10-03 DIAGNOSIS — R30.0 DYSURIA: ICD-10-CM

## 2018-10-03 DIAGNOSIS — Z23 NEED FOR INFLUENZA VACCINATION: ICD-10-CM

## 2018-10-03 DIAGNOSIS — E11.69 HYPERLIPIDEMIA ASSOCIATED WITH TYPE 2 DIABETES MELLITUS (HCC): ICD-10-CM

## 2018-10-03 DIAGNOSIS — E03.9 ACQUIRED HYPOTHYROIDISM: Primary | ICD-10-CM

## 2018-10-03 DIAGNOSIS — E11.21 TYPE 2 DIABETES MELLITUS WITH DIABETIC NEPHROPATHY, WITHOUT LONG-TERM CURRENT USE OF INSULIN (HCC): ICD-10-CM

## 2018-10-03 DIAGNOSIS — E78.5 HYPERLIPIDEMIA ASSOCIATED WITH TYPE 2 DIABETES MELLITUS (HCC): ICD-10-CM

## 2018-10-03 DIAGNOSIS — L84 CALLUS OF FOOT: ICD-10-CM

## 2018-10-03 DIAGNOSIS — I10 ESSENTIAL HYPERTENSION: ICD-10-CM

## 2018-10-03 PROCEDURE — 99214 OFFICE O/P EST MOD 30 MIN: CPT | Performed by: INTERNAL MEDICINE

## 2018-10-03 PROCEDURE — G0008 ADMIN INFLUENZA VIRUS VAC: HCPCS | Performed by: INTERNAL MEDICINE

## 2018-10-03 PROCEDURE — 90662 IIV NO PRSV INCREASED AG IM: CPT | Performed by: INTERNAL MEDICINE

## 2018-10-03 NOTE — PROGRESS NOTES
Chief Complaint  Casandra Kevin is a 77 y.o. female who presents for Hypothyroidism (levothyroxine started 6 weeks ago); Diabetes (A1c was elevated at last check); Follow-up (6 week f/u ); and Imaging Only (DEXA on 9/20/2018, need to go over today)  .    History of Present Illness   Casandra is here for follow up on her thyroid, bp, diabetes, and bone density.   She has some knots on her left palm that are getting worse.  Her bp is staying very well controlled.  She checks it regularly.  No cp or palp or soa.  She has lost 12 lbs.  She has had less swelling in her ankles.  She is counting her calories and eating three meals a day with three snacks.  She's walking for exercise.    Her right shoulder has been bothering her.  She has exercises for her shoulder that she got from PT but hasn't been doing them.  She would really like to avoid shoulder surgery.  She is going to restart the exercises.  She saw her podiatrist and has a script for diabetic shoes she needs filled out.    She feels like she has a little bit of a bladder infection that she is self treating with cranberry.        Review of Systems   Constitution: Positive for weight loss. Negative for malaise/fatigue.   Cardiovascular: Negative for chest pain, dyspnea on exertion, leg swelling and palpitations.   Respiratory: Negative for cough and shortness of breath.    Endocrine: Negative for cold intolerance, heat intolerance, polydipsia and polyuria.   Neurological: Negative for dizziness and light-headedness.       Patient Active Problem List   Diagnosis   • Amaurosis fugax   • Stenosis of carotid artery   • Chronic obstructive pulmonary disease (CMS/HCC)   • Complication of diabetes mellitus (CMS/HCC)   • Hyperlipidemia associated with type 2 diabetes mellitus (CMS/HCC)   • Essential hypertension   • Diastolic dysfunction   • Adiposity   • Thyroid nodule   • Temporary cerebral vascular dysfunction   • Type 2 diabetes mellitus (CMS/HCC)   • Vitamin D deficiency    • Microscopic hematuria   • Acute pain of right shoulder   • Acquired hypothyroidism   • Callus of foot       Past Medical History:   Diagnosis Date   • COPD (chronic obstructive pulmonary disease) (CMS/HCC)    • Diabetes mellitus (CMS/HCC)    • Hyperlipidemia    • Hypertension        Past Surgical History:   Procedure Laterality Date   • APPENDECTOMY     • CHOLECYSTECTOMY     • TONSILLECTOMY         Family History   Problem Relation Age of Onset   • Aneurysm Mother    • Hyperlipidemia Mother    • Cancer Father         throat secondary to smoking   • Coronary artery disease Brother    • Colon cancer Brother        Social History     Social History   • Marital status:      Spouse name: N/A   • Number of children: 3   • Years of education: N/A     Occupational History   • retired teacher/principle      Social History Main Topics   • Smoking status: Former Smoker     Years: 3.00   • Smokeless tobacco: Never Used      Comment: from [age]   • Alcohol use No   • Drug use: No   • Sexual activity: Not on file     Other Topics Concern   • Not on file     Social History Narrative   • No narrative on file       Current Outpatient Prescriptions on File Prior to Visit   Medication Sig Dispense Refill   • albuterol (PROVENTIL HFA;VENTOLIN HFA) 108 (90 BASE) MCG/ACT inhaler every 4 (four) hours.     • amLODIPine (NORVASC) 10 MG tablet TAKE 1 TABLET BY MOUTH EVERY DAY 90 tablet 1   • ascorbic acid (VITAMIN C) 1000 MG tablet Take by mouth.     • atorvastatin (LIPITOR) 10 MG tablet Take 1 tablet by mouth Daily. 90 tablet 3   • budesonide-formoterol (SYMBICORT) 160-4.5 MCG/ACT inhaler 2 (two) times a day.     • Cholecalciferol (VITAMIN D) 2000 UNITS capsule Take by mouth.     • clopidogrel (PLAVIX) 75 MG tablet Take by mouth.     • fenofibrate (TRICOR) 145 MG tablet TAKE 1 TABLET BY MOUTH EVERY DAY 90 tablet 1   • levothyroxine (SYNTHROID, LEVOTHROID) 25 MCG tablet Take 1 tablet by mouth Daily. 30 tablet 6   • metoprolol  "tartrate (LOPRESSOR) 50 MG tablet take 1 tablet by mouth twice a day 60 tablet 3   • omega-3 acid ethyl esters (LOVAZA) 1 g capsule TAKE 4 CAPSULE BY MOUTH ONCE DAILY 360 capsule 0   • quinapril-hydrochlorothiazide (ACCURETIC) 20-12.5 MG per tablet Take 2 tablets by mouth Daily. 180 tablet 2   • sitaGLIPtin-metFORMIN (JANUMET)  MG per tablet Take 1 tablet by mouth 2 (Two) Times a Day With Meals. 180 tablet 3   • vitamin B-12 (CYANOCOBALAMIN) 500 MCG tablet Take by mouth.     • [DISCONTINUED] ezetimibe (ZETIA) 10 MG tablet TAKE 1 TABLET BY MOUTH EVERY DAY 90 tablet 1     No current facility-administered medications on file prior to visit.        Allergies   Allergen Reactions   • Codeine        Vitals:    10/03/18 1039   BP: 122/74   Pulse: 67   Resp: 18   SpO2: 98%   Weight: 90.3 kg (199 lb)   Height: 167.6 cm (65.98\")       Body mass index is 32.14 kg/m².    Objective   Physical Exam   Constitutional: She is oriented to person, place, and time. She appears well-developed and well-nourished. No distress.   HENT:   Head: Normocephalic and atraumatic.   Eyes: Conjunctivae are normal. No scleral icterus.   Neck: Normal range of motion. Neck supple.   Cardiovascular: Normal rate, regular rhythm and normal heart sounds.  Exam reveals no gallop and no friction rub.    No murmur heard.  Pulmonary/Chest: Effort normal and breath sounds normal. No respiratory distress. She has no wheezes. She has no rales.   Musculoskeletal: She exhibits no edema.     Skin Integrity  -  Her right foot skin is intact. She has right foot onychomycosis and callous right foot.Her left foot skin is intact.She has left foot onychomycosis and callous left foot..  Lymphadenopathy:     She has no cervical adenopathy.   Neurological: She is alert and oriented to person, place, and time. No cranial nerve deficit.   Psychiatric: She has a normal mood and affect. Her behavior is normal. Judgment and thought content normal.       Results for orders " placed or performed in visit on 09/19/18   Comprehensive Metabolic Panel   Result Value Ref Range    Glucose 151 (H) 65 - 99 mg/dL    BUN 20 8 - 23 mg/dL    Creatinine 0.80 0.57 - 1.00 mg/dL    eGFR Non African Am 70 >60 mL/min/1.73    eGFR African Am 84 >60 mL/min/1.73    BUN/Creatinine Ratio 25.0 7.0 - 25.0    Sodium 142 136 - 145 mmol/L    Potassium 4.0 3.5 - 5.2 mmol/L    Chloride 100 98 - 107 mmol/L    Total CO2 27.1 22.0 - 29.0 mmol/L    Calcium 9.8 8.6 - 10.5 mg/dL    Total Protein 7.0 6.0 - 8.5 g/dL    Albumin 4.80 3.50 - 5.20 g/dL    Globulin 2.2 gm/dL    A/G Ratio 2.2 g/dL    Total Bilirubin 0.3 0.1 - 1.2 mg/dL    Alkaline Phosphatase 62 39 - 117 U/L    AST (SGOT) 24 1 - 32 U/L    ALT (SGPT) 33 1 - 33 U/L   TSH Rfx On Abnormal To Free T4   Result Value Ref Range    TSH 2.43 0.27 - 4.2 mIU/mL   Hemoglobin A1c   Result Value Ref Range    Hemoglobin A1C 6.80 (H) 4.80 - 5.60 %   CBC & Differential   Result Value Ref Range    WBC 6.31 4.50 - 10.70 10*3/mm3    RBC 4.62 3.90 - 5.20 10*6/mm3    Hemoglobin 13.6 11.9 - 15.5 g/dL    Hematocrit 42.1 35.6 - 45.5 %    MCV 91.1 80.5 - 98.2 fL    MCH 29.4 26.9 - 32.0 pg    MCHC 32.3 (L) 32.4 - 36.3 g/dL    RDW 14.3 (H) 11.7 - 13.0 %    Platelets 272 140 - 500 10*3/mm3    Neutrophil Rel % 39.7 (L) 42.7 - 76.0 %    Lymphocyte Rel % 47.2 (H) 19.6 - 45.3 %    Monocyte Rel % 9.2 5.0 - 12.0 %    Eosinophil Rel % 3.3 0.3 - 6.2 %    Basophil Rel % 0.6 0.0 - 1.5 %    Neutrophils Absolute 2.50 1.90 - 8.10 10*3/mm3    Lymphocytes Absolute 2.98 0.90 - 4.80 10*3/mm3    Monocytes Absolute 0.58 0.20 - 1.20 10*3/mm3    Eosinophils Absolute 0.21 0.00 - 0.70 10*3/mm3    Basophils Absolute 0.04 0.00 - 0.20 10*3/mm3    Immature Granulocyte Rel % 0.2 0.0 - 0.5 %    Immature Grans Absolute 0.01 0.00 - 0.03 10*3/mm3       Assessment/Plan   Diagnoses and all orders for this visit:    Acquired hypothyroidism  -     TSH; Future    Type 2 diabetes mellitus with diabetic nephropathy, without  long-term current use of insulin (CMS/[facility])  -     Hemoglobin A1c; Future  -     Comprehensive Metabolic Panel; Future  -     Lipid Panel; Future    Essential hypertension  -     Comprehensive Metabolic Panel; Future  -     Lipid Panel; Future    Hyperlipidemia associated with type 2 diabetes mellitus (CMS/HCC)  -     Comprehensive Metabolic Panel; Future  -     Lipid Panel; Future    Callus of foot    Need for influenza vaccination  -     Fluzone High Dose =>65Years        Discussion/Summary  Casandra is here for routine follow up.  She is doing very well.  Her bp looks great.  Her A1c is coming down.  Her thyroid responded nicely to the low dose of synthroid.  We are going to stop her zetia and see how her lipids do.  She is on three lipid meds.  I do not think she needs all three.  We will repeat labs in 3 mo.  Continue to work on diet and exercise.  Her goal is to get her weight down to about 150.  Continue all other meds.    Return in about 3 months (around 1/3/2019) for Next scheduled follow up, with fasting labs prior.     Current Outpatient Prescriptions:   •  albuterol (PROVENTIL HFA;VENTOLIN HFA) 108 (90 BASE) MCG/ACT inhaler, every 4 (four) hours., Disp: , Rfl:   •  amLODIPine (NORVASC) 10 MG tablet, TAKE 1 TABLET BY MOUTH EVERY DAY, Disp: 90 tablet, Rfl: 1  •  ascorbic acid (VITAMIN C) 1000 MG tablet, Take by mouth., Disp: , Rfl:   •  atorvastatin (LIPITOR) 10 MG tablet, Take 1 tablet by mouth Daily., Disp: 90 tablet, Rfl: 3  •  budesonide-formoterol (SYMBICORT) 160-4.5 MCG/ACT inhaler, 2 (two) times a day., Disp: , Rfl:   •  Cholecalciferol (VITAMIN D) 2000 UNITS capsule, Take by mouth., Disp: , Rfl:   •  clopidogrel (PLAVIX) 75 MG tablet, Take by mouth., Disp: , Rfl:   •  fenofibrate (TRICOR) 145 MG tablet, TAKE 1 TABLET BY MOUTH EVERY DAY, Disp: 90 tablet, Rfl: 1  •  levothyroxine (SYNTHROID, LEVOTHROID) 25 MCG tablet, Take 1 tablet by mouth Daily., Disp: 30 tablet, Rfl: 6  •  metoprolol tartrate  (LOPRESSOR) 50 MG tablet, take 1 tablet by mouth twice a day, Disp: 60 tablet, Rfl: 3  •  omega-3 acid ethyl esters (LOVAZA) 1 g capsule, TAKE 4 CAPSULE BY MOUTH ONCE DAILY, Disp: 360 capsule, Rfl: 0  •  quinapril-hydrochlorothiazide (ACCURETIC) 20-12.5 MG per tablet, Take 2 tablets by mouth Daily., Disp: 180 tablet, Rfl: 2  •  sitaGLIPtin-metFORMIN (JANUMET)  MG per tablet, Take 1 tablet by mouth 2 (Two) Times a Day With Meals., Disp: 180 tablet, Rfl: 3  •  vitamin B-12 (CYANOCOBALAMIN) 500 MCG tablet, Take by mouth., Disp: , Rfl:

## 2018-10-06 LAB
BACTERIA UR CULT: ABNORMAL
BACTERIA UR CULT: ABNORMAL
OTHER ANTIBIOTIC SUSC ISLT: ABNORMAL

## 2018-10-08 ENCOUNTER — TELEPHONE (OUTPATIENT)
Dept: INTERNAL MEDICINE | Facility: CLINIC | Age: 78
End: 2018-10-08

## 2018-10-08 DIAGNOSIS — M72.0 DUPUYTREN'S CONTRACTURE: Primary | ICD-10-CM

## 2018-10-08 RX ORDER — CIPROFLOXACIN 250 MG/1
250 TABLET, FILM COATED ORAL 2 TIMES DAILY
Qty: 14 TABLET | Refills: 0 | Status: SHIPPED | OUTPATIENT
Start: 2018-10-08 | End: 2019-01-10

## 2018-10-08 NOTE — TELEPHONE ENCOUNTER
Pt informed, states understanding.     Pt was curious if you were going to put a referral in for a hand specialist, stated that you know about it and it had been discussed.

## 2018-10-08 NOTE — TELEPHONE ENCOUNTER
----- Message from Michelle Wilson MD sent at 10/8/2018 10:26 AM EDT -----  Please call - her urine culture is positive.  Will send in cipro to treat.

## 2018-11-07 ENCOUNTER — TRANSCRIBE ORDERS (OUTPATIENT)
Dept: ADMINISTRATIVE | Facility: HOSPITAL | Age: 78
End: 2018-11-07

## 2018-11-07 DIAGNOSIS — Z12.31 VISIT FOR SCREENING MAMMOGRAM: Primary | ICD-10-CM

## 2018-12-17 ENCOUNTER — HOSPITAL ENCOUNTER (OUTPATIENT)
Dept: MAMMOGRAPHY | Facility: HOSPITAL | Age: 78
Discharge: HOME | End: 2018-12-17
Admitting: INTERNAL MEDICINE

## 2018-12-17 DIAGNOSIS — Z12.31 VISIT FOR SCREENING MAMMOGRAM: ICD-10-CM

## 2018-12-17 PROCEDURE — 77067 SCR MAMMO BI INCL CAD: CPT

## 2018-12-21 RX ORDER — METOPROLOL TARTRATE 50 MG/1
TABLET, FILM COATED ORAL
Qty: 60 TABLET | Refills: 3 | Status: SHIPPED | OUTPATIENT
Start: 2018-12-21 | End: 2019-03-13 | Stop reason: SDUPTHER

## 2018-12-26 RX ORDER — CIPROFLOXACIN 250 MG/1
TABLET, FILM COATED ORAL
Qty: 14 TABLET | Refills: 0 | OUTPATIENT
Start: 2018-12-26

## 2018-12-27 RX ORDER — OMEGA-3-ACID ETHYL ESTERS 1 G/1
CAPSULE, LIQUID FILLED ORAL
Qty: 360 CAPSULE | Refills: 1 | Status: SHIPPED | OUTPATIENT
Start: 2018-12-27 | End: 2019-08-12 | Stop reason: SDUPTHER

## 2019-01-01 ENCOUNTER — RESULTS ENCOUNTER (OUTPATIENT)
Dept: INTERNAL MEDICINE | Facility: CLINIC | Age: 79
End: 2019-01-01

## 2019-01-01 DIAGNOSIS — I10 ESSENTIAL HYPERTENSION: ICD-10-CM

## 2019-01-01 DIAGNOSIS — E11.69 HYPERLIPIDEMIA ASSOCIATED WITH TYPE 2 DIABETES MELLITUS (HCC): ICD-10-CM

## 2019-01-01 DIAGNOSIS — E03.9 ACQUIRED HYPOTHYROIDISM: ICD-10-CM

## 2019-01-01 DIAGNOSIS — E11.21 TYPE 2 DIABETES MELLITUS WITH DIABETIC NEPHROPATHY, WITHOUT LONG-TERM CURRENT USE OF INSULIN (HCC): ICD-10-CM

## 2019-01-01 DIAGNOSIS — E78.5 HYPERLIPIDEMIA ASSOCIATED WITH TYPE 2 DIABETES MELLITUS (HCC): ICD-10-CM

## 2019-01-08 LAB
ALBUMIN SERPL-MCNC: 4.4 G/DL (ref 3.5–5.2)
ALBUMIN/GLOB SERPL: 1.6 G/DL
ALP SERPL-CCNC: 62 U/L (ref 39–117)
ALT SERPL-CCNC: 21 U/L (ref 1–33)
AST SERPL-CCNC: 17 U/L (ref 1–32)
BILIRUB SERPL-MCNC: 0.3 MG/DL (ref 0.1–1.2)
BUN SERPL-MCNC: 25 MG/DL (ref 8–23)
BUN/CREAT SERPL: 27.5 (ref 7–25)
CALCIUM SERPL-MCNC: 9.9 MG/DL (ref 8.6–10.5)
CHLORIDE SERPL-SCNC: 102 MMOL/L (ref 98–107)
CHOLEST SERPL-MCNC: 193 MG/DL (ref 0–200)
CO2 SERPL-SCNC: 28.3 MMOL/L (ref 22–29)
CREAT SERPL-MCNC: 0.91 MG/DL (ref 0.57–1)
GLOBULIN SER CALC-MCNC: 2.7 GM/DL
GLUCOSE SERPL-MCNC: 108 MG/DL (ref 65–99)
HBA1C MFR BLD: 6.2 % (ref 4.8–5.6)
HDLC SERPL-MCNC: 54 MG/DL (ref 40–60)
LDLC SERPL CALC-MCNC: 98 MG/DL (ref 0–100)
POTASSIUM SERPL-SCNC: 4 MMOL/L (ref 3.5–5.2)
PROT SERPL-MCNC: 7.1 G/DL (ref 6–8.5)
SODIUM SERPL-SCNC: 144 MMOL/L (ref 136–145)
TRIGL SERPL-MCNC: 206 MG/DL (ref 0–150)
TSH SERPL DL<=0.005 MIU/L-ACNC: 5 MIU/ML (ref 0.27–4.2)
VLDLC SERPL CALC-MCNC: 41.2 MG/DL (ref 5–40)

## 2019-01-10 ENCOUNTER — OFFICE VISIT (OUTPATIENT)
Dept: INTERNAL MEDICINE | Facility: CLINIC | Age: 79
End: 2019-01-10

## 2019-01-10 VITALS
DIASTOLIC BLOOD PRESSURE: 66 MMHG | SYSTOLIC BLOOD PRESSURE: 122 MMHG | RESPIRATION RATE: 18 BRPM | HEART RATE: 64 BPM | WEIGHT: 192 LBS | OXYGEN SATURATION: 98 % | BODY MASS INDEX: 30.86 KG/M2 | HEIGHT: 66 IN

## 2019-01-10 DIAGNOSIS — E11.69 HYPERLIPIDEMIA ASSOCIATED WITH TYPE 2 DIABETES MELLITUS (HCC): Primary | ICD-10-CM

## 2019-01-10 DIAGNOSIS — J43.9 PULMONARY EMPHYSEMA, UNSPECIFIED EMPHYSEMA TYPE (HCC): ICD-10-CM

## 2019-01-10 DIAGNOSIS — E11.9 TYPE 2 DIABETES MELLITUS WITHOUT COMPLICATION, WITHOUT LONG-TERM CURRENT USE OF INSULIN (HCC): ICD-10-CM

## 2019-01-10 DIAGNOSIS — I10 ESSENTIAL HYPERTENSION: ICD-10-CM

## 2019-01-10 DIAGNOSIS — E78.5 HYPERLIPIDEMIA ASSOCIATED WITH TYPE 2 DIABETES MELLITUS (HCC): Primary | ICD-10-CM

## 2019-01-10 DIAGNOSIS — E03.9 ACQUIRED HYPOTHYROIDISM: ICD-10-CM

## 2019-01-10 PROCEDURE — 99214 OFFICE O/P EST MOD 30 MIN: CPT | Performed by: INTERNAL MEDICINE

## 2019-01-10 RX ORDER — LEVOTHYROXINE SODIUM 0.05 MG/1
50 TABLET ORAL DAILY
Qty: 90 TABLET | Refills: 1 | Status: SHIPPED | OUTPATIENT
Start: 2019-01-10 | End: 2020-07-02 | Stop reason: ALTCHOICE

## 2019-01-10 NOTE — PROGRESS NOTES
Chief Complaint  Casandra Kevin is a 78 y.o. female who presents for Hyperlipidemia; Hypertension; Hypothyroidism; and Follow-up (3 month )  .    History of Present Illness   Casandra is here for routine follow up.  She is doing better with her bladder.  No cp or palp or dizziness or soa or edema.  [place]  She is watching her calories.  Continues to lose weight.  She does check her bs.  Normally they are lower than 110.  She had her flu shot.   She had her carotids checks a few weeks ago.  No new concerns.      Review of Systems   Cardiovascular: Negative for chest pain, dyspnea on exertion, leg swelling and palpitations.   Respiratory: Negative for shortness of breath.    Endocrine: Negative for polydipsia and polyuria.   Neurological: Negative for dizziness and light-headedness.       Patient Active Problem List   Diagnosis   • Amaurosis fugax   • Stenosis of carotid artery   • Chronic obstructive pulmonary disease (CMS/HCC)   • Complication of diabetes mellitus (CMS/HCC)   • Hyperlipidemia associated with type 2 diabetes mellitus (CMS/HCC)   • Essential hypertension   • Diastolic dysfunction   • Adiposity   • Thyroid nodule   • Temporary cerebral vascular dysfunction   • Type 2 diabetes mellitus (CMS/HCC)   • Vitamin D deficiency   • Microscopic hematuria   • Acute pain of right shoulder   • Acquired hypothyroidism   • Callus of foot       Past Medical History:   Diagnosis Date   • COPD (chronic obstructive pulmonary disease) (CMS/HCC)    • Diabetes mellitus (CMS/HCC)    • Hyperlipidemia    • Hypertension        Past Surgical History:   Procedure Laterality Date   • APPENDECTOMY     • BREAST BIOPSY     • CHOLECYSTECTOMY     • TONSILLECTOMY         Family History   Problem Relation Age of Onset   • Aneurysm Mother    • Hyperlipidemia Mother    • Cancer Father         throat secondary to smoking   • Coronary artery disease Brother    • Colon cancer Brother        Social History      Socioeconomic History   • Marital status:      Spouse name: Not on file   • Number of children: 3   • Years of education: Not on file   • Highest education level: Not on file   Social Needs   • Financial resource strain: Not on file   • Food insecurity - worry: Not on file   • Food insecurity - inability: Not on file   • Transportation needs - medical: Not on file   • Transportation needs - non-medical: Not on file   Occupational History   • Occupation: retired teacher/principle   Tobacco Use   • Smoking status: Former Smoker     Years: 3.00   • Smokeless tobacco: Never Used   • Tobacco comment: from [age]   Substance and Sexual Activity   • Alcohol use: No   • Drug use: No   • Sexual activity: Not on file   Other Topics Concern   • Not on file   Social History Narrative   • Not on file       Current Outpatient Medications on File Prior to Visit   Medication Sig Dispense Refill   • albuterol (PROVENTIL HFA;VENTOLIN HFA) 108 (90 BASE) MCG/ACT inhaler every 4 (four) hours.     • amLODIPine (NORVASC) 10 MG tablet TAKE 1 TABLET BY MOUTH EVERY DAY 90 tablet 1   • ascorbic acid (VITAMIN C) 1000 MG tablet Take by mouth.     • atorvastatin (LIPITOR) 10 MG tablet Take 1 tablet by mouth Daily. 90 tablet 3   • budesonide-formoterol (SYMBICORT) 160-4.5 MCG/ACT inhaler 2 (two) times a day.     • Cholecalciferol (VITAMIN D) 2000 UNITS capsule Take by mouth.     • clopidogrel (PLAVIX) 75 MG tablet Take by mouth.     • fenofibrate (TRICOR) 145 MG tablet TAKE 1 TABLET BY MOUTH EVERY DAY 90 tablet 1   • metoprolol tartrate (LOPRESSOR) 50 MG tablet TAKE 1 TABLET BY MOUTH TWICE A DAY. 60 tablet 3   • omega-3 acid ethyl esters (LOVAZA) 1 g capsule TAKE 4 CAPSULE BY MOUTH ONCE DAILY 360 capsule 1   • quinapril-hydrochlorothiazide (ACCURETIC) 20-12.5 MG per tablet Take 2 tablets by mouth Daily. 180 tablet 2   • sitaGLIPtin-metFORMIN (JANUMET)  MG per tablet Take 1 tablet by mouth 2 (Two) Times a Day With Meals. 180  "tablet 3   • vitamin B-12 (CYANOCOBALAMIN) 500 MCG tablet Take by mouth.     • [DISCONTINUED] levothyroxine (SYNTHROID, LEVOTHROID) 25 MCG tablet Take 1 tablet by mouth Daily. 30 tablet 6   • [DISCONTINUED] ciprofloxacin (CIPRO) 250 MG tablet Take 1 tablet by mouth 2 (Two) Times a Day. 14 tablet 0     No current facility-administered medications on file prior to visit.        Allergies   Allergen Reactions   • Codeine        Vitals:    01/10/19 1004   BP: 122/66   Pulse: 64   Resp: 18   SpO2: 98%   Weight: 87.1 kg (192 lb)   Height: 167.6 cm (65.98\")       Body mass index is 31 kg/m².    Objective   Physical Exam   Constitutional: She is oriented to person, place, and time. She appears well-developed and well-nourished. No distress.   HENT:   Head: Normocephalic and atraumatic.   Eyes: Conjunctivae are normal. No scleral icterus.   Neck: Normal range of motion. Neck supple.   Cardiovascular: Normal rate, regular rhythm and normal heart sounds. Exam reveals no gallop and no friction rub.   No murmur heard.  Pulmonary/Chest: Effort normal and breath sounds normal. No respiratory distress. She has no wheezes. She has no rales.   Musculoskeletal: She exhibits no edema.   Lymphadenopathy:     She has no cervical adenopathy.   Neurological: She is alert and oriented to person, place, and time. No cranial nerve deficit.   Psychiatric: She has a normal mood and affect. Her behavior is normal. Judgment and thought content normal.       Results for orders placed or performed in visit on 01/01/19   Hemoglobin A1c   Result Value Ref Range    Hemoglobin A1C 6.20 (H) 4.80 - 5.60 %   Comprehensive Metabolic Panel   Result Value Ref Range    Glucose 108 (H) 65 - 99 mg/dL    BUN 25 (H) 8 - 23 mg/dL    Creatinine 0.91 0.57 - 1.00 mg/dL    eGFR Non African Am 60 (L) >60 mL/min/1.73    eGFR African Am 72 >60 mL/min/1.73    BUN/Creatinine Ratio 27.5 (H) 7.0 - 25.0    Sodium 144 136 - 145 mmol/L    Potassium 4.0 3.5 - 5.2 mmol/L    " Chloride 102 98 - 107 mmol/L    Total CO2 28.3 22.0 - 29.0 mmol/L    Calcium 9.9 8.6 - 10.5 mg/dL    Total Protein 7.1 6.0 - 8.5 g/dL    Albumin 4.40 3.50 - 5.20 g/dL    Globulin 2.7 gm/dL    A/G Ratio 1.6 g/dL    Total Bilirubin 0.3 0.1 - 1.2 mg/dL    Alkaline Phosphatase 62 39 - 117 U/L    AST (SGOT) 17 1 - 32 U/L    ALT (SGPT) 21 1 - 33 U/L   Lipid Panel   Result Value Ref Range    Total Cholesterol 193 0 - 200 mg/dL    Triglycerides 206 (H) 0 - 150 mg/dL    HDL Cholesterol 54 40 - 60 mg/dL    VLDL Cholesterol 41.2 (H) 5 - 40 mg/dL    LDL Cholesterol  98 0 - 100 mg/dL   TSH   Result Value Ref Range    TSH 5.000 (H) 0.270 - 4.200 mIU/mL       Assessment/Plan   Diagnoses and all orders for this visit:    Hyperlipidemia associated with type 2 diabetes mellitus (CMS/[facility])    Essential hypertension    Acquired hypothyroidism    Type 2 diabetes mellitus without complication, without long-term current use of insulin (CMS/[facility])    Pulmonary emphysema, unspecified emphysema type (CMS/[facility])    Other orders  -     levothyroxine (SYNTHROID, LEVOTHROID) 50 MCG tablet; Take 1 tablet by mouth Daily.        Discussion/Summary  Casandra is here for routine follow up.  Her weight continues to come down.  Her bp looks good.  Her A1c looks great.  Her TSH is no controlled.  We are increasing her levothyroxine from 25 to 50.  She is leaving to go back to [place] in two weeks and won't be home until May.  She will come in then to have her TSH repeated.  She has been taking a lot of aleve for aches and pains.  We discussed her creatinine which has bumped up slightly.  Advised to minimize the use of aleve.  Use tylenol first.  Continue all other meds.    Return in about 6 months (around 7/10/2019) for Annual physical, with fasting labs prior; labs in May.

## 2019-03-13 RX ORDER — METOPROLOL TARTRATE 50 MG/1
TABLET, FILM COATED ORAL
Qty: 180 TABLET | Refills: 1 | Status: SHIPPED | OUTPATIENT
Start: 2019-03-13

## 2019-03-13 RX ORDER — LEVOTHYROXINE SODIUM 0.03 MG/1
TABLET ORAL
Qty: 30 TABLET | Refills: 4 | Status: SHIPPED | OUTPATIENT
Start: 2019-03-13 | End: 2020-07-02 | Stop reason: ALTCHOICE

## 2019-03-25 RX ORDER — AMLODIPINE BESYLATE 10 MG/1
TABLET ORAL
Qty: 90 TABLET | Refills: 1 | Status: SHIPPED | OUTPATIENT
Start: 2019-03-25

## 2019-03-25 RX ORDER — FENOFIBRATE 145 MG/1
TABLET, COATED ORAL
Qty: 90 TABLET | Refills: 1 | Status: ON HOLD | OUTPATIENT
Start: 2019-03-25 | End: 2020-08-13

## 2019-04-01 RX ORDER — QUINAPRIL HCL AND HYDROCHLOROTHIAZIDE 20; 12.5 MG/1; MG/1
2 TABLET ORAL DAILY
Qty: 180 TABLET | Refills: 0 | Status: SHIPPED | OUTPATIENT
Start: 2019-04-01 | End: 2019-08-12 | Stop reason: SDUPTHER

## 2019-07-08 RX ORDER — ATORVASTATIN CALCIUM 10 MG/1
10 TABLET, FILM COATED ORAL DAILY
Qty: 30 TABLET | Refills: 1 | Status: SHIPPED | OUTPATIENT
Start: 2019-07-08 | End: 2020-07-02 | Stop reason: ALTCHOICE

## 2019-07-08 RX ORDER — ATORVASTATIN CALCIUM 10 MG/1
10 TABLET, FILM COATED ORAL DAILY
Qty: 90 TABLET | Refills: 1 | OUTPATIENT
Start: 2019-07-08

## 2019-08-12 RX ORDER — OMEGA-3-ACID ETHYL ESTERS 1 G/1
1 CAPSULE, LIQUID FILLED ORAL DAILY
Qty: 30 CAPSULE | Refills: 0 | Status: SHIPPED | OUTPATIENT
Start: 2019-08-12 | End: 2019-08-12 | Stop reason: SDUPTHER

## 2019-08-12 RX ORDER — QUINAPRIL HCL AND HYDROCHLOROTHIAZIDE 20; 12.5 MG/1; MG/1
2 TABLET ORAL DAILY
Qty: 60 TABLET | Refills: 0 | Status: SHIPPED | OUTPATIENT
Start: 2019-08-12

## 2019-08-12 RX ORDER — OMEGA-3-ACID ETHYL ESTERS 1 G/1
1 CAPSULE, LIQUID FILLED ORAL DAILY
Qty: 90 CAPSULE | Refills: 0 | Status: SHIPPED | OUTPATIENT
Start: 2019-08-12

## 2019-08-13 RX ORDER — QUINAPRIL HCL AND HYDROCHLOROTHIAZIDE 20; 12.5 MG/1; MG/1
2 TABLET ORAL DAILY
Qty: 180 TABLET | Refills: 0 | OUTPATIENT
Start: 2019-08-13

## 2019-11-19 ENCOUNTER — TRANSCRIBE ORDERS (OUTPATIENT)
Dept: INFUSION THERAPY | Facility: HOSPITAL | Age: 79
End: 2019-11-19

## 2019-11-19 DIAGNOSIS — Z12.31 SCREENING MAMMOGRAM, ENCOUNTER FOR: Primary | ICD-10-CM

## 2019-12-27 ENCOUNTER — HOSPITAL ENCOUNTER (OUTPATIENT)
Dept: MAMMOGRAPHY | Facility: HOSPITAL | Age: 79
Discharge: HOME | End: 2019-12-27
Admitting: FAMILY MEDICINE

## 2019-12-27 DIAGNOSIS — Z12.31 SCREENING MAMMOGRAM, ENCOUNTER FOR: ICD-10-CM

## 2019-12-27 PROCEDURE — 77063 BREAST TOMOSYNTHESIS BI: CPT

## 2019-12-27 PROCEDURE — 77067 SCR MAMMO BI INCL CAD: CPT

## 2020-07-01 NOTE — PROGRESS NOTES
Date of Office Visit: 20  Encounter Provider: Jesus Man MD  Place of Service: [facility] CARDIOLOGY  Patient Name: Casandra Kevin  :1940    Chief Complaint   Patient presents with   • AMAUROSIS FUJAX   :     HPI:     Ms. Kevin is 79 y.o. and presents today at the request of Dr Leticia Sun.    She had a previous episode of left amaurosis fugax and was diagnosed with mild bilateral carotid artery stenosis in .  She was placed on clopidogrel; however, she only takes it every other day due to bruising.    In May 2020, she had a brief episode of right sided amaurosis fugax.  A carotid doppler again showed mild disease (<50% on the right, <15% on the left).    She had an echo in our office in  which was unremarkable.  She had a normal perfusion stress test in .    She denies chest pain, leg swelling, orthopnea, PND, lightheadedness, syncope, or palpitations.  She has mild chronic dyspnea and has COPD.    Past Medical History:   Diagnosis Date   • Carotid artery stenosis     right 15-50%, left 1-15%   • COPD (chronic obstructive pulmonary disease) (CMS/HCC)    • Hyperlipidemia    • Hypertension    • Hypothyroidism    • Obesity (BMI 30-39.9)    • Thyroid nodule 2016    Impression: 2015 - Dr. Flores is monitoring this.  He elected not to bx.  She has an u/s tomorrow.  Impression: 2015 - She has seen Dr. Flores and he has recommended a bx of the nodule in the coming months according to Casandra.  I think it should be ok for her to come off the plavix for 7 days to have hudson bx done.  We talked at length about risks of coming off plavix vs risk of not proceed   • Type 2 diabetes mellitus (CMS/HCC)    • Vitamin D deficiency 2016       Past Surgical History:   Procedure Laterality Date   • APPENDECTOMY     • BREAST BIOPSY     • CHOLECYSTECTOMY     • TONSILLECTOMY         Social History     Socioeconomic History   • Marital status:      Spouse  name: Not on file   • Number of children: 3   • Years of education: Not on file   • Highest education level: Not on file   Occupational History   • Occupation: retired teacher/principle   Tobacco Use   • Smoking status: Former Smoker     Years: 3.00   • Smokeless tobacco: Never Used   • Tobacco comment: from [age]   Substance and Sexual Activity   • Alcohol use: No   • Drug use: No       Family History   Problem Relation Age of Onset   • Aneurysm Mother    • Hyperlipidemia Mother    • Cancer Father         throat secondary to smoking   • Coronary artery disease Brother         17 CARDIAC STENTS   • Colon cancer Brother        Review of Systems   Eyes: Positive for vision loss in right eye.   Cardiovascular: Negative for chest pain and palpitations.   Respiratory: Positive for shortness of breath.    Neurological: Negative for dizziness and light-headedness.   All other systems reviewed and are negative.      Allergies   Allergen Reactions   • Codeine          Current Outpatient Medications:   •  albuterol (PROVENTIL HFA;VENTOLIN HFA) 108 (90 BASE) MCG/ACT inhaler, every 4 (four) hours., Disp: , Rfl:   •  amLODIPine (NORVASC) 10 MG tablet, TAKE 1 TABLET BY MOUTH EVERY DAY, Disp: 90 tablet, Rfl: 1  •  ascorbic acid (VITAMIN C) 1000 MG tablet, Take by mouth., Disp: , Rfl:   •  atorvastatin (LIPITOR) 40 MG tablet, Take 40 mg by mouth Daily., Disp: , Rfl:   •  Cholecalciferol (VITAMIN D) 2000 UNITS capsule, Take by mouth., Disp: , Rfl:   •  clopidogrel (PLAVIX) 75 MG tablet, Take  by mouth Every Other Day., Disp: , Rfl:   •  fenofibrate (TRICOR) 145 MG tablet, TAKE 1 TABLET BY MOUTH EVERY DAY, Disp: 90 tablet, Rfl: 1  •  metoprolol tartrate (LOPRESSOR) 50 MG tablet, TAKE 1 TABLET BY MOUTH TWICE A DAY., Disp: 180 tablet, Rfl: 1  •  omega-3 acid ethyl esters (LOVAZA) 1 g capsule, TAKE 1 CAPSULE BY MOUTH DAILY, Disp: 90 capsule, Rfl: 0  •  quinapril-hydrochlorothiazide (ACCURETIC) 20-12.5 MG per tablet, Take 2 tablets by  "mouth Daily., Disp: 60 tablet, Rfl: 0  •  sitaGLIPtin-metFORMIN (JANUMET)  MG per tablet, Take 1 tablet by mouth 2 (Two) Times a Day With Meals., Disp: 60 tablet, Rfl: 1  •  vitamin B-12 (CYANOCOBALAMIN) 500 MCG tablet, Take by mouth., Disp: , Rfl:   •  budesonide-formoterol (SYMBICORT) 160-4.5 MCG/ACT inhaler, 2 (two) times a day., Disp: , Rfl:       Objective:     Vitals:    07/02/20 1106   BP: 140/80   BP Location: Left arm   Pulse: 65   Weight: 92.1 kg (203 lb)   Height: 167.4 cm (65.9\")     Body mass index is 32.86 kg/m².    Physical Exam   Constitutional: She is oriented to person, place, and time. She appears well-developed and well-nourished.   HENT:   Head: Normocephalic.   Nose: Nose normal.   Mouth/Throat: Oropharynx is clear and moist.   Eyes: Pupils are equal, round, and reactive to light. Conjunctivae and EOM are normal.   Neck: Normal range of motion. No JVD present.   Cardiovascular: Normal rate, regular rhythm, normal heart sounds and intact distal pulses.   No murmur heard.  Pulmonary/Chest: Effort normal and breath sounds normal.   Abdominal: Soft. There is no tenderness.   Musculoskeletal: Normal range of motion. She exhibits no edema.   Neurological: She is alert and oriented to person, place, and time. No cranial nerve deficit.   Skin: Skin is warm and dry. No rash noted.   Psychiatric: She has a normal mood and affect. Her behavior is normal. Judgment and thought content normal.   Vitals reviewed.        ECG 12 Lead  Date/Time: 7/2/2020 11:47 AM  Performed by: Jesus Man MD  Authorized by: Jesus Man MD   Comparison: not compared with previous ECG   Previous ECG: no previous ECG available  Rhythm: sinus rhythm  Conduction: conduction normal  ST Segments: ST segments normal  T Waves: T waves normal  QRS axis: normal  Other: no other findings    Clinical impression: normal ECG              Assessment:       Diagnosis Plan   1. Amaurosis fugax  Adult Transthoracic Echo Complete W/ " Cont if Necessary Per Protocol    Holter Monitor - 72 Hour Up To 21 Days   2. Carotid atherosclerosis, right     3. Essential hypertension     4. Hyperlipidemia associated with type 2 diabetes mellitus (CMS/HCC)     5. Palpitations   Holter Monitor - 72 Hour Up To 21 Days          Plan:       I'm quite concerned that she has had two episodes of amaurosis fugax in the last 18 months, and in both eyes.  Her carotid disease is quite mild.  While that doesn't mean it can't still be atheroembolic (especially with her taking her clopidogrel every other day instead of daily), I have a lot of concern that she may have underlying PAF. She's a woman, she's 79, she's overweight, and she has COPD/HTN/DM.      I will get an echo and a 14 day monitor.  If I don't catch any atrial arrhythmias on that, I will recommend a LINQ.    Sincerely,       Jseus Man MD

## 2020-07-02 ENCOUNTER — OFFICE VISIT (OUTPATIENT)
Dept: CARDIOLOGY | Facility: CLINIC | Age: 80
End: 2020-07-02

## 2020-07-02 VITALS
HEIGHT: 66 IN | BODY MASS INDEX: 32.62 KG/M2 | SYSTOLIC BLOOD PRESSURE: 140 MMHG | WEIGHT: 203 LBS | HEART RATE: 65 BPM | DIASTOLIC BLOOD PRESSURE: 80 MMHG

## 2020-07-02 DIAGNOSIS — R00.2 PALPITATIONS: ICD-10-CM

## 2020-07-02 DIAGNOSIS — E78.5 HYPERLIPIDEMIA ASSOCIATED WITH TYPE 2 DIABETES MELLITUS (HCC): ICD-10-CM

## 2020-07-02 DIAGNOSIS — I65.21 CAROTID ATHEROSCLEROSIS, RIGHT: ICD-10-CM

## 2020-07-02 DIAGNOSIS — G45.3 AMAUROSIS FUGAX: Primary | ICD-10-CM

## 2020-07-02 DIAGNOSIS — I10 ESSENTIAL HYPERTENSION: ICD-10-CM

## 2020-07-02 DIAGNOSIS — E11.69 HYPERLIPIDEMIA ASSOCIATED WITH TYPE 2 DIABETES MELLITUS (HCC): ICD-10-CM

## 2020-07-02 PROBLEM — M25.511 ACUTE PAIN OF RIGHT SHOULDER: Status: RESOLVED | Noted: 2017-05-15 | Resolved: 2020-07-02

## 2020-07-02 PROBLEM — L84 CALLUS OF FOOT: Status: RESOLVED | Noted: 2018-10-03 | Resolved: 2020-07-02

## 2020-07-02 PROCEDURE — 99204 OFFICE O/P NEW MOD 45 MIN: CPT | Performed by: INTERNAL MEDICINE

## 2020-07-02 PROCEDURE — 93000 ELECTROCARDIOGRAM COMPLETE: CPT | Performed by: INTERNAL MEDICINE

## 2020-07-02 RX ORDER — ATORVASTATIN CALCIUM 40 MG/1
40 TABLET, FILM COATED ORAL DAILY
COMMUNITY

## 2020-07-15 ENCOUNTER — HOSPITAL ENCOUNTER (OUTPATIENT)
Dept: CARDIOLOGY | Facility: HOSPITAL | Age: 80
Discharge: HOME | End: 2020-07-15
Admitting: INTERNAL MEDICINE

## 2020-07-15 VITALS
BODY MASS INDEX: 33.82 KG/M2 | DIASTOLIC BLOOD PRESSURE: 70 MMHG | HEIGHT: 65 IN | WEIGHT: 203 LBS | HEART RATE: 72 BPM | OXYGEN SATURATION: 96 % | SYSTOLIC BLOOD PRESSURE: 142 MMHG

## 2020-07-15 DIAGNOSIS — G45.3 AMAUROSIS FUGAX: ICD-10-CM

## 2020-07-15 PROCEDURE — 93306 TTE W/DOPPLER COMPLETE: CPT | Performed by: INTERNAL MEDICINE

## 2020-07-15 PROCEDURE — 93306 TTE W/DOPPLER COMPLETE: CPT

## 2020-07-16 LAB
AORTIC ARCH: 2.5 CM
ASCENDING AORTA: 3.2 CM
BH CV ECHO MEAS - ACS: 1.7 CM
BH CV ECHO MEAS - AO ARCH DIAM (PROXIMAL TRANS.): 2.5 CM
BH CV ECHO MEAS - AO MAX PG (FULL): 3.8 MMHG
BH CV ECHO MEAS - AO MAX PG: 7.9 MMHG
BH CV ECHO MEAS - AO MEAN PG (FULL): 2.2 MMHG
BH CV ECHO MEAS - AO MEAN PG: 4.5 MMHG
BH CV ECHO MEAS - AO ROOT AREA (BSA CORRECTED): 1.3
BH CV ECHO MEAS - AO ROOT AREA: 5 CM^2
BH CV ECHO MEAS - AO ROOT DIAM: 2.5 CM
BH CV ECHO MEAS - AO V2 MAX: 140.3 CM/SEC
BH CV ECHO MEAS - AO V2 MEAN: 101.5 CM/SEC
BH CV ECHO MEAS - AO V2 VTI: 31.6 CM
BH CV ECHO MEAS - ASC AORTA: 3.2 CM
BH CV ECHO MEAS - AVA(I,A): 1.8 CM^2
BH CV ECHO MEAS - AVA(I,D): 1.8 CM^2
BH CV ECHO MEAS - AVA(V,A): 1.8 CM^2
BH CV ECHO MEAS - AVA(V,D): 1.8 CM^2
BH CV ECHO MEAS - BSA(HAYCOCK): 2.1 M^2
BH CV ECHO MEAS - BSA: 2 M^2
BH CV ECHO MEAS - BZI_BMI: 33.8 KILOGRAMS/M^2
BH CV ECHO MEAS - BZI_METRIC_HEIGHT: 165.1 CM
BH CV ECHO MEAS - BZI_METRIC_WEIGHT: 92.1 KG
BH CV ECHO MEAS - EDV(MOD-SP2): 66 ML
BH CV ECHO MEAS - EDV(MOD-SP4): 78 ML
BH CV ECHO MEAS - EDV(TEICH): 91.3 ML
BH CV ECHO MEAS - EF(CUBED): 70 %
BH CV ECHO MEAS - EF(MOD-BP): 63.4 %
BH CV ECHO MEAS - EF(MOD-SP2): 62.1 %
BH CV ECHO MEAS - EF(MOD-SP4): 66.7 %
BH CV ECHO MEAS - EF(TEICH): 61.8 %
BH CV ECHO MEAS - ESV(MOD-SP2): 25 ML
BH CV ECHO MEAS - ESV(MOD-SP4): 26 ML
BH CV ECHO MEAS - ESV(TEICH): 34.8 ML
BH CV ECHO MEAS - FS: 33.1 %
BH CV ECHO MEAS - IVS/LVPW: 0.88
BH CV ECHO MEAS - IVSD: 0.99 CM
BH CV ECHO MEAS - LAT PEAK E' VEL: 7.4 CM/SEC
BH CV ECHO MEAS - LV DIASTOLIC VOL/BSA (35-75): 39.2 ML/M^2
BH CV ECHO MEAS - LV MASS(C)D: 164.8 GRAMS
BH CV ECHO MEAS - LV MASS(C)DI: 82.8 GRAMS/M^2
BH CV ECHO MEAS - LV MAX PG: 4 MMHG
BH CV ECHO MEAS - LV MEAN PG: 2.4 MMHG
BH CV ECHO MEAS - LV SYSTOLIC VOL/BSA (12-30): 13.1 ML/M^2
BH CV ECHO MEAS - LV V1 MAX: 100.3 CM/SEC
BH CV ECHO MEAS - LV V1 MEAN: 72.8 CM/SEC
BH CV ECHO MEAS - LV V1 VTI: 23.1 CM
BH CV ECHO MEAS - LVIDD: 4.5 CM
BH CV ECHO MEAS - LVIDS: 3 CM
BH CV ECHO MEAS - LVLD AP2: 7.2 CM
BH CV ECHO MEAS - LVLD AP4: 6.9 CM
BH CV ECHO MEAS - LVLS AP2: 5.3 CM
BH CV ECHO MEAS - LVLS AP4: 5.8 CM
BH CV ECHO MEAS - LVOT AREA (M): 2.5 CM^2
BH CV ECHO MEAS - LVOT AREA: 2.5 CM^2
BH CV ECHO MEAS - LVOT DIAM: 1.8 CM
BH CV ECHO MEAS - LVPWD: 1.1 CM
BH CV ECHO MEAS - MED PEAK E' VEL: 6.5 CM/SEC
BH CV ECHO MEAS - MV A DUR: 0.08 SEC
BH CV ECHO MEAS - MV A MAX VEL: 98.5 CM/SEC
BH CV ECHO MEAS - MV DEC SLOPE: 318 CM/SEC^2
BH CV ECHO MEAS - MV DEC TIME: 0.23 SEC
BH CV ECHO MEAS - MV E MAX VEL: 77.1 CM/SEC
BH CV ECHO MEAS - MV E/A: 0.78
BH CV ECHO MEAS - MV MAX PG: 5.6 MMHG
BH CV ECHO MEAS - MV MEAN PG: 2.5 MMHG
BH CV ECHO MEAS - MV P1/2T MAX VEL: 86.3 CM/SEC
BH CV ECHO MEAS - MV P1/2T: 79.5 MSEC
BH CV ECHO MEAS - MV V2 MAX: 118.3 CM/SEC
BH CV ECHO MEAS - MV V2 MEAN: 75.9 CM/SEC
BH CV ECHO MEAS - MV V2 VTI: 36.9 CM
BH CV ECHO MEAS - MVA P1/2T LCG: 2.5 CM^2
BH CV ECHO MEAS - MVA(P1/2T): 2.8 CM^2
BH CV ECHO MEAS - MVA(VTI): 1.6 CM^2
BH CV ECHO MEAS - PA ACC TIME: 0.06 SEC
BH CV ECHO MEAS - PA MAX PG (FULL): 1.2 MMHG
BH CV ECHO MEAS - PA MAX PG: 2.6 MMHG
BH CV ECHO MEAS - PA PR(ACCEL): 50.5 MMHG
BH CV ECHO MEAS - PA V2 MAX: 80.1 CM/SEC
BH CV ECHO MEAS - PULM A REVS DUR: 0.09 SEC
BH CV ECHO MEAS - PULM A REVS VEL: 29.1 CM/SEC
BH CV ECHO MEAS - PULM DIAS VEL: 40.3 CM/SEC
BH CV ECHO MEAS - PULM S/D: 0.94
BH CV ECHO MEAS - PULM SYS VEL: 37.7 CM/SEC
BH CV ECHO MEAS - PVA(V,A): 2.3 CM^2
BH CV ECHO MEAS - PVA(V,D): 2.3 CM^2
BH CV ECHO MEAS - QP/QS: 0.66
BH CV ECHO MEAS - RAP SYSTOLE: 3 MMHG
BH CV ECHO MEAS - RV MAX PG: 1.4 MMHG
BH CV ECHO MEAS - RV MEAN PG: 0.78 MMHG
BH CV ECHO MEAS - RV V1 MAX: 58.3 CM/SEC
BH CV ECHO MEAS - RV V1 MEAN: 40.8 CM/SEC
BH CV ECHO MEAS - RV V1 VTI: 12 CM
BH CV ECHO MEAS - RVOT AREA: 3.2 CM^2
BH CV ECHO MEAS - RVOT DIAM: 2 CM
BH CV ECHO MEAS - RVSP: 31.3 MMHG
BH CV ECHO MEAS - SI(AO): 79.6 ML/M^2
BH CV ECHO MEAS - SI(CUBED): 31.5 ML/M^2
BH CV ECHO MEAS - SI(LVOT): 29.1 ML/M^2
BH CV ECHO MEAS - SI(MOD-SP2): 20.6 ML/M^2
BH CV ECHO MEAS - SI(MOD-SP4): 26.1 ML/M^2
BH CV ECHO MEAS - SI(TEICH): 28.3 ML/M^2
BH CV ECHO MEAS - SUP REN AO DIAM: 2 CM
BH CV ECHO MEAS - SV(AO): 158.5 ML
BH CV ECHO MEAS - SV(CUBED): 62.8 ML
BH CV ECHO MEAS - SV(LVOT): 57.9 ML
BH CV ECHO MEAS - SV(MOD-SP2): 41 ML
BH CV ECHO MEAS - SV(MOD-SP4): 52 ML
BH CV ECHO MEAS - SV(RVOT): 38.5 ML
BH CV ECHO MEAS - SV(TEICH): 56.4 ML
BH CV ECHO MEAS - TAPSE (>1.6): 2.3 CM2
BH CV ECHO MEAS - TR MAX VEL: 266 CM/SEC
BH CV ECHO MEASUREMENTS AVERAGE E/E' RATIO: 11.09
BH CV VAS BP RIGHT ARM: NORMAL MMHG
BH CV XLRA - RV BASE: 3 CM
BH CV XLRA - RV LENGTH: 6.7 CM
BH CV XLRA - RV MID: 3.3 CM
BH CV XLRA - TDI S': 11 CM/SEC
LEFT ATRIUM VOLUME INDEX: 18 ML/M2
MAXIMAL PREDICTED HEART RATE: 141 BPM
SINUS: 2.9 CM
STJ: 2.8 CM
STRESS TARGET HR: 120 BPM

## 2020-07-17 ENCOUNTER — TELEPHONE (OUTPATIENT)
Dept: CARDIOLOGY | Facility: CLINIC | Age: 80
End: 2020-07-17

## 2020-07-27 ENCOUNTER — TELEPHONE (OUTPATIENT)
Dept: CARDIOLOGY | Facility: CLINIC | Age: 80
End: 2020-07-27

## 2020-07-27 NOTE — TELEPHONE ENCOUNTER
Dr. Man,    Ms. Jackson called this morning for results.      She states that she would like to go to [place] to visit her brother and would like to know if that would be ok from a cardiac standpoint.    We did discuss that [place] has a high concentration of CoVid19 patients and she would need to take precautions.  She will be gone about 2 weeks.      Thank you,  Estela Valadez RN  [facility]  Triage

## 2020-07-27 NOTE — TELEPHONE ENCOUNTER
Dr. Naresh Alcaraz response Her Zio has not been sent to me by the company yet.  Her echo is normal.  I'll call her when I have the Zio back.     My official medical recommendation is that NO ONE travel to high prevalence COVID areas right now. However, it's not a mandate.  Her cardiac status is not what I worry about as much as just the fact that she's 79 and therefore high risk.    Please let her know.    jena lee

## 2020-07-27 NOTE — TELEPHONE ENCOUNTER
I notified the patient of your recommendations. She verbalized understanding and states that she is still trying to decide on the visit.  Her brother is dying and that's the only reason she would risk going.  If she does go, it will be midweek next week.     Her Zio is due to be sent back on Wednesday.    Thank you,    Estela Valadez RN  [facility]  Triage

## 2020-08-06 DIAGNOSIS — G45.3 AMAUROSIS FUGAX: Primary | ICD-10-CM

## 2020-08-10 ENCOUNTER — TRANSCRIBE ORDERS (OUTPATIENT)
Dept: SLEEP MEDICINE | Facility: HOSPITAL | Age: 80
End: 2020-08-10

## 2020-08-10 DIAGNOSIS — Z01.818 OTHER SPECIFIED PRE-OPERATIVE EXAMINATION: Primary | ICD-10-CM

## 2020-08-11 ENCOUNTER — LAB (OUTPATIENT)
Dept: LAB | Facility: HOSPITAL | Age: 80
End: 2020-08-11

## 2020-08-11 DIAGNOSIS — Z01.818 OTHER SPECIFIED PRE-OPERATIVE EXAMINATION: ICD-10-CM

## 2020-08-11 PROCEDURE — U0004 COV-19 TEST NON-CDC HGH THRU: HCPCS

## 2020-08-11 PROCEDURE — C9803 HOPD COVID-19 SPEC COLLECT: HCPCS

## 2020-08-12 LAB
REF LAB TEST METHOD: NORMAL
SARS-COV-2 RNA RESP QL NAA+PROBE: NOT DETECTED

## 2020-08-13 ENCOUNTER — HOSPITAL ENCOUNTER (OUTPATIENT)
Facility: HOSPITAL | Age: 80
Setting detail: HOSPITAL OUTPATIENT SURGERY
Discharge: HOME | End: 2020-08-13
Attending: INTERNAL MEDICINE | Admitting: INTERNAL MEDICINE

## 2020-08-13 VITALS
TEMPERATURE: 97.7 F | WEIGHT: 200 LBS | HEART RATE: 67 BPM | OXYGEN SATURATION: 95 % | DIASTOLIC BLOOD PRESSURE: 77 MMHG | RESPIRATION RATE: 16 BRPM | SYSTOLIC BLOOD PRESSURE: 167 MMHG | HEIGHT: 66 IN | BODY MASS INDEX: 32.14 KG/M2

## 2020-08-13 DIAGNOSIS — G45.3 AMAUROSIS FUGAX: ICD-10-CM

## 2020-08-13 LAB — GLUCOSE BLDC GLUCOMTR-MCNC: 118 MG/DL (ref 70–130)

## 2020-08-13 PROCEDURE — 82962 GLUCOSE BLOOD TEST: CPT

## 2020-08-13 PROCEDURE — C1764 EVENT RECORDER, CARDIAC: HCPCS | Performed by: INTERNAL MEDICINE

## 2020-08-13 PROCEDURE — 33285 INSJ SUBQ CAR RHYTHM MNTR: CPT | Performed by: INTERNAL MEDICINE

## 2020-08-13 DEVICE — ICM LP/RECRD REVEAL LINQ MEDTRONIC: Type: IMPLANTABLE DEVICE | Status: FUNCTIONAL

## 2020-08-13 RX ORDER — SODIUM CHLORIDE 0.9 % (FLUSH) 0.9 %
10 SYRINGE (ML) INJECTION AS NEEDED
Status: DISCONTINUED | OUTPATIENT
Start: 2020-08-13 | End: 2020-08-13 | Stop reason: HOSPADM

## 2020-08-13 RX ORDER — OXYCODONE HYDROCHLORIDE AND ACETAMINOPHEN 5; 325 MG/1; MG/1
1 TABLET ORAL ONCE AS NEEDED
Status: DISCONTINUED | OUTPATIENT
Start: 2020-08-13 | End: 2020-08-13 | Stop reason: HOSPADM

## 2020-08-13 RX ORDER — LIDOCAINE HYDROCHLORIDE AND EPINEPHRINE 10; 10 MG/ML; UG/ML
INJECTION, SOLUTION INFILTRATION; PERINEURAL AS NEEDED
Status: DISCONTINUED | OUTPATIENT
Start: 2020-08-13 | End: 2020-08-13 | Stop reason: HOSPADM

## 2020-08-13 RX ORDER — SODIUM CHLORIDE 9 MG/ML
75 INJECTION, SOLUTION INTRAVENOUS CONTINUOUS
Status: DISCONTINUED | OUTPATIENT
Start: 2020-08-13 | End: 2020-08-13 | Stop reason: HOSPADM

## 2020-08-13 RX ORDER — SODIUM CHLORIDE 0.9 % (FLUSH) 0.9 %
3 SYRINGE (ML) INJECTION EVERY 12 HOURS SCHEDULED
Status: DISCONTINUED | OUTPATIENT
Start: 2020-08-13 | End: 2020-08-13 | Stop reason: HOSPADM

## 2020-08-13 RX ORDER — LIDOCAINE HYDROCHLORIDE 10 MG/ML
0.1 INJECTION, SOLUTION EPIDURAL; INFILTRATION; INTRACAUDAL; PERINEURAL ONCE AS NEEDED
Status: DISCONTINUED | OUTPATIENT
Start: 2020-08-13 | End: 2020-08-13 | Stop reason: HOSPADM

## 2020-08-13 RX ADMIN — SODIUM CHLORIDE 75 ML/HR: 9 INJECTION, SOLUTION INTRAVENOUS at 06:49

## 2020-08-13 NOTE — H&P
Patient Name: Casandra Kevin  Age/Sex: 79 y.o. female  : 1940  MRN: [id number]    Date of Admission: 2020  Date of Encounter Visit: 20  Encounter Provider: Olvin Bonner MD  Place of Service: [facility] CARDIOLOGY      Referring Provider: Olvin Bonner MD  Patient Care Team:  Jake Monaco MD as PCP - General (Family Medicine)  Geovany Dela Cruz APRN as PCP - Claims Attributed    Subjective:   Admitted for: Internal loop recorder implant    Chief Complaint: Amaurosis    History of Present Illness:  Casandra Kevin is a 79 y.o. female patient of Dr. Man who is had 2 episodes of amaurosis.  There is a possibility that her events are precipitated by atrial fibrillation and an internal loop recorder is being placed to monitor rhythm.        Past Medical History:  Past Medical History:   Diagnosis Date   • Carotid artery stenosis     right 15-50%, left 1-15%   • COPD (chronic obstructive pulmonary disease) (CMS/HCC)    • Hyperlipidemia    • Hypertension    • Hypothyroidism    • Obesity (BMI 30-39.9)    • Thyroid nodule 2016    Impression: 2015 - Dr. Flores is monitoring this.  He elected not to bx.  She has an u/s tomorrow.  Impression: 2015 - She has seen Dr. Flores and he has recommended a bx of the nodule in the coming months according to Casandra.  I think it should be ok for her to come off the plavix for 7 days to have hudson bx done.  We talked at length about risks of coming off plavix vs risk of not proceed   • Type 2 diabetes mellitus (CMS/HCC)    • Vitamin D deficiency 2016       Past Surgical History:   Procedure Laterality Date   • APPENDECTOMY     • BREAST BIOPSY     • CATARACT EXTRACTION Bilateral    • CHOLECYSTECTOMY     • TONSILLECTOMY         Home Medications:   Medications Prior to Admission   Medication Sig Dispense Refill Last Dose   • albuterol (PROVENTIL HFA;VENTOLIN HFA) 108 (90 BASE) MCG/ACT inhaler  every 4 (four) hours.   8/12/2020 at Unknown time   • amLODIPine (NORVASC) 10 MG tablet TAKE 1 TABLET BY MOUTH EVERY DAY 90 tablet 1 8/12/2020 at Unknown time   • ascorbic acid (VITAMIN C) 1000 MG tablet Take by mouth.   8/12/2020 at Unknown time   • atorvastatin (LIPITOR) 40 MG tablet Take 40 mg by mouth Daily.   8/12/2020 at Unknown time   • budesonide-formoterol (SYMBICORT) 160-4.5 MCG/ACT inhaler 2 (two) times a day.   8/12/2020 at Unknown time   • Cholecalciferol (VITAMIN D) 2000 UNITS capsule Take by mouth.   8/12/2020 at Unknown time   • clopidogrel (PLAVIX) 75 MG tablet Take  by mouth Every Other Day.   8/12/2020 at Unknown time   • CRANBERRY PO Take  by mouth Daily.   8/12/2020 at Unknown time   • Flaxseed, Linseed, (FLAX SEEDS PO) Take  by mouth Daily.   8/12/2020 at Unknown time   • MAGNESIUM PO Take 1 tablet by mouth Daily.   8/12/2020 at Unknown time   • metoprolol tartrate (LOPRESSOR) 50 MG tablet TAKE 1 TABLET BY MOUTH TWICE A DAY. 180 tablet 1 8/12/2020 at Unknown time   • omega-3 acid ethyl esters (LOVAZA) 1 g capsule TAKE 1 CAPSULE BY MOUTH DAILY 90 capsule 0 8/12/2020 at Unknown time   • quinapril-hydrochlorothiazide (ACCURETIC) 20-12.5 MG per tablet Take 2 tablets by mouth Daily. 60 tablet 0 8/12/2020 at Unknown time   • sitaGLIPtin-metFORMIN (JANUMET)  MG per tablet Take 1 tablet by mouth 2 (Two) Times a Day With Meals. 60 tablet 1 8/12/2020 at Unknown time   • TURMERIC PO Take  by mouth Daily.   8/12/2020 at Unknown time   • vitamin B-12 (CYANOCOBALAMIN) 500 MCG tablet Take by mouth.   8/12/2020 at Unknown time       Allergies:  Allergies   Allergen Reactions   • Codeine Mental Status Change     STATES THE MED CAUSES SEVERE DEPRESSION       Past Social History:  Social History     Socioeconomic History   • Marital status:      Spouse name: Not on file   • Number of children: 3   • Years of education: Not on file   • Highest education level: Not on file   Occupational History   •  Occupation: retired teacher/principle   Tobacco Use   • Smoking status: Former Smoker     Years: 3.00   • Smokeless tobacco: Never Used   • Tobacco comment: from [age]   Substance and Sexual Activity   • Alcohol use: No   • Drug use: No   • Sexual activity: Defer        Past Family History:  Family History   Problem Relation Age of Onset   • Aneurysm Mother    • Hyperlipidemia Mother    • Cancer Father         throat secondary to smoking   • Coronary artery disease Brother         17 CARDIAC STENTS   • Colon cancer Brother        Review of Systems: All systems reviewed. Pertinent positives identified in HPI. All other systems are negative.     REVIEW OF SYSTEMS:   CONSTITUTIONAL: No weight loss, fever, chills, weakness or fatigue.   HEENT: Eyes: No visual loss, blurred vision, double vision or yellow sclerae. Ears, Nose, Throat: No hearing loss, sneezing, congestion, runny nose or sore throat.   SKIN: No rash or itching.     RESPIRATORY: No shortness of breath, hemoptysis, cough or sputum.   GASTROINTESTINAL: No anorexia, nausea, vomiting or diarrhea. No abdominal pain, bright red blood per rectum or melena.  GENITOURINARY: No burning on urination, hematuria or increased frequency.  NEUROLOGICAL: No headache, dizziness, syncope, paralysis, ataxia, numbness or tingling in the extremities. No change in bowel or bladder control.   MUSCULOSKELETAL: No muscle, back pain, joint pain or stiffness.   HEMATOLOGIC: No anemia, bleeding or bruising.   LYMPHATICS: No enlarged nodes. No history of splenectomy.   PSYCHIATRIC: No history of depression, anxiety, hallucinations.   ENDOCRINOLOGIC: No reports of sweating, cold or heat intolerance. No polyuria or polydipsia.       Objective:     Objective:  Temp:  [97.7 °F (36.5 °C)] 97.7 °F (36.5 °C)  Heart Rate:  [69] 69  Resp:  [20] 20  BP: (175)/(78) 175/78  No intake or output data in the 24 hours ending 08/13/20 0704  Body mass index is 32.28 kg/m².      08/13/20  0644   Weight:  90.7 kg (200 lb)           Physical Exam:   Physical Exam   Constitutional: She is oriented to person, place, and time. She appears well-developed and well-nourished.   Neck: No JVD present. Carotid bruit is not present. No thyromegaly present.   Cardiovascular: Normal rate, regular rhythm, S1 normal, S2 normal, normal heart sounds and intact distal pulses. Exam reveals no gallop and no friction rub.   No murmur heard.  Pulmonary/Chest: Effort normal and breath sounds normal.   Musculoskeletal: She exhibits no edema.   Neurological: She is alert and oriented to person, place, and time.   Skin: Skin is intact. No erythema.   Psychiatric: She has a normal mood and affect.   Vitals reviewed.              Baseline:     I personally viewed and interpreted the patient's EKG/Telemetry data.    Assessment:   Assessment/Plan         Amaurosis fugax            Plan:     Internal loop recorder implant    Thank you for allowing me to participate in the care of Casandra Kevin. Feel free to contact me directly with any further questions or concerns.    Olvin Bonner MD  [facility]  08/13/20  07:04

## 2020-08-14 ENCOUNTER — TELEPHONE (OUTPATIENT)
Dept: CARDIOLOGY | Facility: CLINIC | Age: 80
End: 2020-08-14

## 2020-08-14 NOTE — TELEPHONE ENCOUNTER
Patient needs 1 week site check for loop recorder  Patient can be reached at [phone]  Patient left message that her brother dyed and would be gone 10-15 days, going to [place]  Please call patient

## 2020-08-14 NOTE — TELEPHONE ENCOUNTER
I spoke to pt and she is leaving town and wants to [place] 2 weeks from now.  I [place] for 8/28 and she will call to move up if she returns home sooner.

## 2020-08-28 ENCOUNTER — TELEPHONE (OUTPATIENT)
Dept: CARDIOLOGY | Facility: CLINIC | Age: 80
End: 2020-08-28

## 2020-08-28 ENCOUNTER — CLINICAL SUPPORT NO REQUIREMENTS (OUTPATIENT)
Dept: CARDIOLOGY | Facility: CLINIC | Age: 80
End: 2020-08-28

## 2020-08-28 DIAGNOSIS — I63.9 CRYPTOGENIC STROKE (HCC): Primary | ICD-10-CM

## 2020-08-28 PROCEDURE — 93285 PRGRMG DEV EVAL SCRMS IP: CPT | Performed by: INTERNAL MEDICINE

## 2020-08-28 NOTE — TELEPHONE ENCOUNTER
RICHARD.  She was here for initial ILR check and all detection was off in her device.   I set it up the same way you had me set up the one last week that had all detection off:    Pause, tachy and AF >6 min.  I left at nominal settings for each of these.  Also had to enter all implant info.  If you want it programmed otherwise just lmk.    Strange 2 weeks in a row that these are not set up at implant

## 2020-09-10 ENCOUNTER — OFFICE VISIT (OUTPATIENT)
Dept: CARDIOLOGY | Facility: CLINIC | Age: 80
End: 2020-09-10

## 2020-09-10 VITALS
HEART RATE: 67 BPM | HEIGHT: 66 IN | DIASTOLIC BLOOD PRESSURE: 80 MMHG | BODY MASS INDEX: 32.75 KG/M2 | WEIGHT: 203.8 LBS | SYSTOLIC BLOOD PRESSURE: 150 MMHG

## 2020-09-10 DIAGNOSIS — E78.2 MIXED HYPERLIPIDEMIA: ICD-10-CM

## 2020-09-10 DIAGNOSIS — I65.21 STENOSIS OF RIGHT CAROTID ARTERY: ICD-10-CM

## 2020-09-10 DIAGNOSIS — E66.09 CLASS 1 OBESITY DUE TO EXCESS CALORIES WITHOUT SERIOUS COMORBIDITY WITH BODY MASS INDEX (BMI) OF 32.0 TO 32.9 IN ADULT: ICD-10-CM

## 2020-09-10 DIAGNOSIS — Z95.818 STATUS POST PLACEMENT OF IMPLANTABLE LOOP RECORDER: ICD-10-CM

## 2020-09-10 DIAGNOSIS — G45.3 AMAUROSIS FUGAX: Primary | ICD-10-CM

## 2020-09-10 DIAGNOSIS — I10 ESSENTIAL HYPERTENSION: ICD-10-CM

## 2020-09-10 DIAGNOSIS — I67.82 TEMPORARY CEREBRAL VASCULAR DYSFUNCTION: ICD-10-CM

## 2020-09-10 PROCEDURE — 99214 OFFICE O/P EST MOD 30 MIN: CPT | Performed by: NURSE PRACTITIONER

## 2020-09-10 PROCEDURE — 93000 ELECTROCARDIOGRAM COMPLETE: CPT | Performed by: NURSE PRACTITIONER

## 2020-09-10 RX ORDER — ICOSAPENT ETHYL 1000 MG/1
2 CAPSULE ORAL 2 TIMES DAILY
COMMUNITY
Start: 2020-07-21

## 2020-09-10 RX ORDER — GUAIFENESIN 600 MG/1
TABLET, EXTENDED RELEASE ORAL DAILY
COMMUNITY

## 2020-09-10 NOTE — PROGRESS NOTES
Date of Office Visit: 09/10/2020  Encounter Provider: LARRY Noguera  Place of Service: [facility] CARDIOLOGY  Patient Name: Casandra Kevin  :1940  Primary Cardiologist: Dr. Jesus Man     Chief Complaint   Patient presents with   • Amaurosis fugax   • Follow-up   :     Dear Dr. Monaco,     HPI: Casandra Kevin is a pleasant 79 y.o. female who presents today for cardiac follow up. She is a new patient to me and her previous records have been reviewed.    She has a history of a previous episode of amaurosis fugax and was diagnosed with mild bilateral carotid artery stenosis in 2018.  She was placed on clopidogrel only takes it every other day due to bruising.    In May 2020, she had a brief episode of right-sided amaurosis fugax.  Carotid Doppler showed mild disease less than 50% on the right and less than 15% on the left carotid disease.    In 2020, Dr. Leticia Sun referred the patient to Dr. Jesus Man.  Upon review of her records she had a normal echo cardiogram in our office in  and a normal perfusion stress test in .  During her office visit she reported mild chronic dyspnea from COPD.  Dr. Man was concerned about the potential for paroxysmal atrial fibrillation with 2 episodes of amaurosis fugax in both eyes within the past 18 months.  Dr. Man recommended an echocardiogram and 14-day monitor.  She said if there were no atrial arrhythmias on the monitor then she would recommend a Linq implantation.    On 7/15/2020, echocardiogram showed normal EF of 63%, saline test negative, aortic valve calcification and trace mitral regurgitation.  On 2020, 14-day Holter monitor showed normal sinus rhythm with an average heart rate of 67, rare APCs, no evidence of atrial fibrillation, multiple short runs of SVT the longest 10 beats in duration, and rare PVCs.    On 2020, she underwent Linq loop recorder implantation by Dr. Corbin Bonner.  On  8/28/2020 her loop recorder was interrogated and showed no evidence of atrial fibrillation.    She presents today for her follow-up visit.  Her loop recorder incision has already healed.  She denies any further amaurosis fugax symptoms.  She still taking her clopidogrel every other day because she bruises easily.  Her blood pressure is elevated and she has not had her morning medications.  Her blood pressures are running 136-147/73 with heart rates in the 60s-70s at home.  She denies chest pain, shortness of breath, palpitations, edema, dizziness, syncope, or bleeding.    I reviewed her recent blood work from 6/26/2020: CMP showed normal kidney function and potassium.  Total cholesterol 151, triglycerides 215, HDL 50, and LDL 38.  TSH 4.8 and T4 normal.  Her last CBC 8/28/2019 showed hemoglobin and hematocrit normal.    Past Medical History:   Diagnosis Date   • Carotid artery stenosis     right 15-50%, left 1-15%   • COPD (chronic obstructive pulmonary disease) (CMS/[facility])    • Hyperlipidemia    • Hypertension    • Hypothyroidism    • Obesity (BMI 30-39.9)    • Thyroid nodule 2/14/2016    Impression: 08/11/2015 - Dr. Flores is monitoring this.  He elected not to bx.  She has an u/s tomorrow.  Impression: 05/11/2015 - She has seen Dr. Flores and he has recommended a bx of the nodule in the coming months according to Casandra.  I think it should be ok for her to come off the plavix for 7 days to have hudson bx done.  We talked at length about risks of coming off plavix vs risk of not proceed   • Type 2 diabetes mellitus (CMS/HCC)    • Vitamin D deficiency 2/14/2016       Past Surgical History:   Procedure Laterality Date   • APPENDECTOMY     • BREAST BIOPSY     • CARDIAC ELECTROPHYSIOLOGY PROCEDURE N/A 8/13/2020    Procedure: Loop insertion LINQ;  Surgeon: Olvin Bonner MD;  Location: [facility] INVASIVE LOCATION;  Service: Cardiovascular;  Laterality: N/A;   • CATARACT EXTRACTION Bilateral    • CHOLECYSTECTOMY     •  TONSILLECTOMY         Social History     Socioeconomic History   • Marital status:      Spouse name: Not on file   • Number of children: 3   • Years of education: Not on file   • Highest education level: Not on file   Occupational History   • Occupation: retired teacher/principle   Tobacco Use   • Smoking status: Former Smoker     Years: 3.00   • Smokeless tobacco: Never Used   • Tobacco comment: from [age]   Substance and Sexual Activity   • Alcohol use: No   • Drug use: No   • Sexual activity: Defer       Family History   Problem Relation Age of Onset   • Aneurysm Mother    • Hyperlipidemia Mother    • Cancer Father         throat secondary to smoking   • Coronary artery disease Brother         17 CARDIAC STENTS   • Colon cancer Brother        The following portion of the patient's history were reviewed and updated as appropriate: past medical history, past surgical history, past social history, past family history, allergies, current medications, and problem list.    Review of Systems   Constitution: Negative for chills, diaphoresis, fever, malaise/fatigue, night sweats, weight gain and weight loss.   HENT: Negative for hearing loss, nosebleeds, sore throat and tinnitus.    Eyes: Negative for blurred vision, double vision, pain and visual disturbance.   Cardiovascular: Negative for chest pain, claudication, cyanosis, dyspnea on exertion, irregular heartbeat, leg swelling, near-syncope, orthopnea, palpitations, paroxysmal nocturnal dyspnea and syncope.   Respiratory: Negative for cough, hemoptysis, shortness of breath, snoring and wheezing.    Endocrine: Negative for cold intolerance, heat intolerance and polyuria.   Hematologic/Lymphatic: Negative for bleeding problem. Does not bruise/bleed easily.   Skin: Negative for color change, dry skin, flushing and itching.   Musculoskeletal: Negative for falls, joint pain, joint swelling, muscle cramps, muscle weakness and myalgias.   Gastrointestinal: Negative for  abdominal pain, constipation, heartburn, melena, nausea and vomiting.   Genitourinary: Negative for dysuria and hematuria.   Neurological: Negative for excessive daytime sleepiness, dizziness, light-headedness, loss of balance, numbness, paresthesias, seizures and vertigo.   Psychiatric/Behavioral: Negative for altered mental status, depression, memory loss and substance abuse. The patient does not have insomnia and is not nervous/anxious.    Allergic/Immunologic: Negative for environmental allergies.       Allergies   Allergen Reactions   • Codeine Mental Status Change     STATES THE MED CAUSES SEVERE DEPRESSION         Current Outpatient Medications:   •  albuterol (PROVENTIL HFA;VENTOLIN HFA) 108 (90 BASE) MCG/ACT inhaler, Every 6 (Six) Hours As Needed., Disp: , Rfl:   •  amLODIPine (NORVASC) 10 MG tablet, TAKE 1 TABLET BY MOUTH EVERY DAY, Disp: 90 tablet, Rfl: 1  •  ascorbic acid (VITAMIN C) 1000 MG tablet, Take by mouth., Disp: , Rfl:   •  atorvastatin (LIPITOR) 40 MG tablet, Take 40 mg by mouth Daily., Disp: , Rfl:   •  budesonide-formoterol (SYMBICORT) 160-4.5 MCG/ACT inhaler, 2 (two) times a day., Disp: , Rfl:   •  Calcium Carbonate-Vit D-Min (CALCIUM 1200 PO), Take  by mouth Daily., Disp: , Rfl:   •  Cholecalciferol (VITAMIN D) 2000 UNITS capsule, Take by mouth., Disp: , Rfl:   •  clopidogrel (PLAVIX) 75 MG tablet, Take  by mouth Every Other Day., Disp: , Rfl:   •  CRANBERRY PO, Take  by mouth Daily., Disp: , Rfl:   •  Flaxseed, Linseed, (FLAX SEEDS PO), Take  by mouth Daily., Disp: , Rfl:   •  guaiFENesin (MUCINEX) 600 MG 12 hr tablet, Take  by mouth Daily., Disp: , Rfl:   •  icosapent ethyl (Vascepa) 1 g capsule capsule, Take 2 g by mouth 2 (Two) Times a Day., Disp: , Rfl:   •  MAGNESIUM PO, Take 1 tablet by mouth Daily., Disp: , Rfl:   •  metoprolol tartrate (LOPRESSOR) 50 MG tablet, TAKE 1 TABLET BY MOUTH TWICE A DAY., Disp: 180 tablet, Rfl: 1  •  omega-3 acid ethyl esters (LOVAZA) 1 g capsule, TAKE 1  "CAPSULE BY MOUTH DAILY (Patient taking differently: Take 1 g by mouth 2 (Two) Times a Day.), Disp: 90 capsule, Rfl: 0  •  quinapril-hydrochlorothiazide (ACCURETIC) 20-12.5 MG per tablet, Take 2 tablets by mouth Daily., Disp: 60 tablet, Rfl: 0  •  sitaGLIPtin-metFORMIN (JANUMET)  MG per tablet, Take 1 tablet by mouth 2 (Two) Times a Day With Meals., Disp: 60 tablet, Rfl: 1  •  TURMERIC PO, Take  by mouth Daily., Disp: , Rfl:   •  Vitamin A 3 MG (32769 UT) capsule, Take 10,000 Units by mouth Daily., Disp: , Rfl:   •  vitamin B-12 (CYANOCOBALAMIN) 500 MCG tablet, Take by mouth., Disp: , Rfl:         Objective:     Vitals:    09/10/20 0941   BP: 150/80   Pulse: 67   Weight: 92.4 kg (203 lb 12.8 oz)   Height: 167.6 cm (66\")     Body mass index is 32.89 kg/m².    PHYSICAL EXAM:    Vitals Reviewed.   General Appearance: No acute distress, well developed and well nourished. Obese.   Eyes: Conjunctiva and lids: No erythema, swelling, or discharge. Sclera non-icteric.   HENT: Atraumatic, normocephalic. External eyes, ears, and nose normal. No hearing loss noted. Mucous membranes normal. Lips not cyanotic. Neck supple with no tenderness.  Respiratory: No signs of respiratory distress. Respiration rhythm and depth normal.   Clear to auscultation. No rales, crackles, rhonchi, or wheezing auscultated.   Cardiovascular:  Jugular Venous Pressure: Normal  Heart Rate and Rhythm: Normal, Heart Sounds: Normal S1 and S2. No S3 or S4 noted.  Murmurs: No murmurs noted. No rubs, thrills, or gallops.   Lower Extremities: No edema noted.  Gastrointestinal:  Abdomen soft, non-distended, non-tender. Normal bowel sounds.    Musculoskeletal: Normal movement of extremities  Skin and Nails: General appearance normal. No pallor, cyanosis, diaphoresis. Skin temperature normal. No clubbing of fingernails.   Psychiatric: Patient alert and oriented to person, place, and time. Speech and behavior appropriate. Normal mood and affect.       ECG 12 " Lead  Date/Time: 9/10/2020 9:35 AM  Performed by: Lindsay Hollis APRN  Authorized by: Lindsay Hollis APRN   Comparison: compared with previous ECG from 7/2/2020  Similar to previous ECG  Rhythm: sinus rhythm  Rate: normal  BPM: 67  Conduction: conduction normal  ST Segments: ST segments normal  T Waves: T waves normal  QRS axis: normal  Other: no other findings    Clinical impression: normal ECG              Assessment:       Diagnosis Plan   1. Amaurosis fugax     2. Temporary cerebral vascular dysfunction     3. Status post placement of implantable loop recorder     4. Stenosis of right carotid artery     5. Essential hypertension     6. Mixed hyperlipidemia     7. Class 1 obesity due to excess calories without serious comorbidity with body mass index (BMI) of 32.0 to 32.9 in adult            Plan:       1/2. Amaurosis Fugax: Last episode occurred in May 2020.  No recurrent symptoms.    3.  Loop Recorder: Implanted in August 2020 by Dr. Bonner.  First interrogation showed no evidence of atrial fibrillation.  I explained that we are looking for atrial fibrillation that may have caused her amaurosis fugax symptoms.    4.  Carotid Artery Disease:Carotid Doppler showed mild disease less than 50% on the right and less than 15% on the left carotid disease. Followed by Dr. Leticia Sun.  Currently on atorvastatin and clopidogrel.  I told her if the bruising improves, I would recommend taking the clopidogrel daily.    5.  Hypertension: Blood pressure elevated today but she did not take her morning medications.  I recommend that she continue to monitor for blood pressure goal less than 130/80.  If her blood pressure remains elevated, she should follow-up with her PCP.    6.  Hyperlipidemia: Remains on atorvastatin.    7.  Obesity: BMI is 32.9.  She would benefit from exercise, weight loss, and heart healthy, low-sodium diet.    8.  I discussed the plan of care with Dr. Jesus Man and she recommended a 1 year  follow-up visit with her.    As always, it has been a pleasure to participate in your patient's care. Thank you.       Sincerely,       LARRY Ramos  [facility] Cardiology      · COVID-19 Precautions - Patient was compliant in wearing a mask. When I saw the patient, I used appropriate personal protective equipment (PPE) including mask (standard procedure).  Additionally, I used gown, gloves, and eye shield if indicated.  Hand hygiene was completed before and after seeing the patient.  · Dictated utilizing Dragon Dictation

## 2020-10-21 ENCOUNTER — TELEPHONE (OUTPATIENT)
Dept: CARDIOLOGY | Facility: CLINIC | Age: 80
End: 2020-10-21

## 2020-10-21 NOTE — TELEPHONE ENCOUNTER
16 remotes on her Linq since 9/2/20 due to  Tachy episodes that are not true, oversensing the T wave.  Can we turn this alert off?  Diagnosis is cryptogenic stroke.  Thanks. Ade

## 2020-12-03 ENCOUNTER — TRANSCRIBE ORDERS (OUTPATIENT)
Dept: ADMINISTRATIVE | Facility: HOSPITAL | Age: 80
End: 2020-12-03

## 2020-12-03 DIAGNOSIS — Z12.39 SCREENING BREAST EXAMINATION: Primary | ICD-10-CM

## 2020-12-11 ENCOUNTER — HOSPITAL ENCOUNTER (OUTPATIENT)
Dept: MAMMOGRAPHY | Facility: HOSPITAL | Age: 80
Discharge: HOME | End: 2020-12-11
Admitting: FAMILY MEDICINE

## 2020-12-11 DIAGNOSIS — Z12.39 SCREENING BREAST EXAMINATION: ICD-10-CM

## 2020-12-11 PROCEDURE — 77063 BREAST TOMOSYNTHESIS BI: CPT

## 2020-12-11 PROCEDURE — 77067 SCR MAMMO BI INCL CAD: CPT

## 2020-12-14 ENCOUNTER — TELEPHONE (OUTPATIENT)
Dept: CARDIOLOGY | Facility: CLINIC | Age: 80
End: 2020-12-14

## 2020-12-14 NOTE — TELEPHONE ENCOUNTER
RICHARD  . Linq remote was reviewed and device is reporting a fib . I do not think it is but wanted to send you a strip. DM CCT

## 2021-03-15 ENCOUNTER — BULK ORDERING (OUTPATIENT)
Dept: CASE MANAGEMENT | Facility: OTHER | Age: 81
End: 2021-03-15

## 2021-03-15 DIAGNOSIS — Z23 IMMUNIZATION DUE: ICD-10-CM

## 2021-03-19 PROCEDURE — 93298 REM INTERROG DEV EVAL SCRMS: CPT | Performed by: INTERNAL MEDICINE

## 2021-03-19 PROCEDURE — G2066 INTER DEVC REMOTE 30D: HCPCS | Performed by: INTERNAL MEDICINE

## 2021-04-19 PROCEDURE — G2066 INTER DEVC REMOTE 30D: HCPCS | Performed by: INTERNAL MEDICINE

## 2021-04-19 PROCEDURE — 93298 REM INTERROG DEV EVAL SCRMS: CPT | Performed by: INTERNAL MEDICINE

## 2021-06-20 PROCEDURE — G2066 INTER DEVC REMOTE 30D: HCPCS | Performed by: INTERNAL MEDICINE

## 2021-06-20 PROCEDURE — 93298 REM INTERROG DEV EVAL SCRMS: CPT | Performed by: INTERNAL MEDICINE

## 2021-07-21 PROCEDURE — G2066 INTER DEVC REMOTE 30D: HCPCS | Performed by: INTERNAL MEDICINE

## 2021-07-21 PROCEDURE — 93298 REM INTERROG DEV EVAL SCRMS: CPT | Performed by: INTERNAL MEDICINE

## 2021-08-21 PROCEDURE — G2066 INTER DEVC REMOTE 30D: HCPCS | Performed by: INTERNAL MEDICINE

## 2021-08-21 PROCEDURE — 93298 REM INTERROG DEV EVAL SCRMS: CPT | Performed by: INTERNAL MEDICINE

## 2021-09-13 ENCOUNTER — OFFICE VISIT (OUTPATIENT)
Dept: CARDIOLOGY | Facility: CLINIC | Age: 81
End: 2021-09-13

## 2021-09-13 VITALS
HEART RATE: 68 BPM | SYSTOLIC BLOOD PRESSURE: 140 MMHG | BODY MASS INDEX: 33.07 KG/M2 | HEIGHT: 66 IN | WEIGHT: 205.8 LBS | DIASTOLIC BLOOD PRESSURE: 70 MMHG

## 2021-09-13 DIAGNOSIS — I65.21 STENOSIS OF RIGHT CAROTID ARTERY: ICD-10-CM

## 2021-09-13 DIAGNOSIS — G45.3 AMAUROSIS FUGAX: Primary | ICD-10-CM

## 2021-09-13 DIAGNOSIS — I10 ESSENTIAL HYPERTENSION: ICD-10-CM

## 2021-09-13 PROCEDURE — 93000 ELECTROCARDIOGRAM COMPLETE: CPT | Performed by: INTERNAL MEDICINE

## 2021-09-13 PROCEDURE — 99213 OFFICE O/P EST LOW 20 MIN: CPT | Performed by: INTERNAL MEDICINE

## 2021-09-13 NOTE — PROGRESS NOTES
Date of Office Visit: 2021    Encounter Provider: Jesus Man MD  Place of Service: [facility] CARDIOLOGY  Patient Name: Casandra Kevin  :1940    Chief Complaint   Patient presents with   • Hypertension   :     HPI:     Ms. Kevin is 80 y.o. and presents today to follow up. I have reviewed prior notes and there are no changes except for any new updates described below. I have also reviewed any information entered into the medical record by the patient or by ancillary staff.     She had a previous episode of left amaurosis fugax and was diagnosed with mild bilateral carotid artery stenosis in .  She was placed on clopidogrel; however, she only takes it every other day due to bruising.    In May 2020, she had a brief episode of right sided amaurosis fugax.  A carotid doppler again showed mild disease (<50% on the right, <15% on the left). An echo was unremarkable (other than mild aortic sclerosis); a 13 day monitor showed benign ectopy. A LINQ was implanted; so far, we have not caught any atrial arrhythmias.     She denies chest pain, leg swelling, orthopnea, PND, lightheadedness, syncope, or palpitations.  She has mild chronic dyspnea and has COPD.    Past Medical History:   Diagnosis Date   • Carotid artery stenosis     right 15-50%, left 1-15%   • COPD (chronic obstructive pulmonary disease) (CMS/HCC)    • Hyperlipidemia    • Hypertension    • Hypothyroidism    • Obesity (BMI 30-39.9)    • Thyroid nodule 2016    Impression: 2015 - Dr. Flores is monitoring this.  He elected not to bx.  She has an u/s tomorrow.  Impression: 2015 - She has seen Dr. Flores and he has recommended a bx of the nodule in the coming months according to Casandra.  I think it should be ok for her to come off the plavix for 7 days to have hudson bx done.  We talked at length about risks of coming off plavix vs risk of not proceed   • Type 2 diabetes mellitus (CMS/HCC)    • Vitamin D  deficiency 2016       Past Surgical History:   Procedure Laterality Date   • APPENDECTOMY     • BREAST BIOPSY     • CARDIAC ELECTROPHYSIOLOGY PROCEDURE N/A 2020    Procedure: Loop insertion LINQ;  Surgeon: Olvin Bonner MD;  Location: [facility] INVASIVE LOCATION;  Service: Cardiovascular;  Laterality: N/A;   • CATARACT EXTRACTION Bilateral    • CHOLECYSTECTOMY     • TONSILLECTOMY         Social History     Socioeconomic History   • Marital status:      Spouse name: Not on file   • Number of children: 3   • Years of education: Not on file   • Highest education level: Not on file   Tobacco Use   • Smoking status: Former Smoker     Years: 3.00     Quit date:      Years since quittin.7   • Smokeless tobacco: Never Used   Substance and Sexual Activity   • Alcohol use: No     Comment: Caffeine use: None   • Drug use: No   • Sexual activity: Defer       Family History   Problem Relation Age of Onset   • Aneurysm Mother    • Hyperlipidemia Mother    • Cancer Father         throat secondary to smoking   • Coronary artery disease Brother         17 CARDIAC STENTS   • Colon cancer Brother        Review of Systems   Cardiovascular: Negative for chest pain and palpitations.   Respiratory: Positive for shortness of breath.    Neurological: Negative for dizziness and light-headedness.   All other systems reviewed and are negative.      Allergies   Allergen Reactions   • Codeine Mental Status Change     STATES THE MED CAUSES SEVERE DEPRESSION         Current Outpatient Medications:   •  albuterol (PROVENTIL HFA;VENTOLIN HFA) 108 (90 BASE) MCG/ACT inhaler, Every 6 (Six) Hours As Needed., Disp: , Rfl:   •  amLODIPine (NORVASC) 10 MG tablet, TAKE 1 TABLET BY MOUTH EVERY DAY, Disp: 90 tablet, Rfl: 1  •  ascorbic acid (VITAMIN C) 1000 MG tablet, Take by mouth., Disp: , Rfl:   •  atorvastatin (LIPITOR) 40 MG tablet, Take 40 mg by mouth Daily., Disp: , Rfl:   •  budesonide-formoterol (SYMBICORT) 160-4.5  "MCG/ACT inhaler, 2 (two) times a day., Disp: , Rfl:   •  Calcium Carbonate-Vit D-Min (CALCIUM 1200 PO), Take  by mouth Daily., Disp: , Rfl:   •  Cholecalciferol (VITAMIN D) 2000 UNITS capsule, Take by mouth., Disp: , Rfl:   •  clopidogrel (PLAVIX) 75 MG tablet, Take  by mouth Every Other Day., Disp: , Rfl:   •  CRANBERRY PO, Take  by mouth Daily., Disp: , Rfl:   •  Flaxseed, Linseed, (FLAX SEEDS PO), Take  by mouth Daily., Disp: , Rfl:   •  guaiFENesin (MUCINEX) 600 MG 12 hr tablet, Take  by mouth Daily., Disp: , Rfl:   •  icosapent ethyl (Vascepa) 1 g capsule capsule, Take 2 g by mouth 2 (Two) Times a Day., Disp: , Rfl:   •  MAGNESIUM PO, Take 1 tablet by mouth Daily., Disp: , Rfl:   •  metoprolol tartrate (LOPRESSOR) 50 MG tablet, TAKE 1 TABLET BY MOUTH TWICE A DAY., Disp: 180 tablet, Rfl: 1  •  omega-3 acid ethyl esters (LOVAZA) 1 g capsule, TAKE 1 CAPSULE BY MOUTH DAILY (Patient taking differently: Take 1 g by mouth 2 (Two) Times a Day.), Disp: 90 capsule, Rfl: 0  •  quinapril-hydrochlorothiazide (ACCURETIC) 20-12.5 MG per tablet, Take 2 tablets by mouth Daily., Disp: 60 tablet, Rfl: 0  •  sitaGLIPtin-metFORMIN (JANUMET)  MG per tablet, Take 1 tablet by mouth 2 (Two) Times a Day With Meals., Disp: 60 tablet, Rfl: 1  •  TURMERIC PO, Take  by mouth Daily., Disp: , Rfl:   •  Vitamin A 3 MG (56360 UT) capsule, Take 10,000 Units by mouth Daily., Disp: , Rfl:   •  vitamin B-12 (CYANOCOBALAMIN) 500 MCG tablet, Take by mouth., Disp: , Rfl:       Objective:     Vitals:    09/13/21 1001   BP: 140/70   BP Location: Left arm   Pulse: 68   Weight: 93.4 kg (205 lb 12.8 oz)   Height: 167.6 cm (66\")     Body mass index is 33.22 kg/m².    Physical Exam  Vitals reviewed.   Constitutional:       Appearance: She is well-developed.   HENT:      Head: Normocephalic.      Nose: Nose normal.      Mouth/Throat:      Comments: masked  Eyes:      Conjunctiva/sclera: Conjunctivae normal.   Neck:      Vascular: No JVD. "   Cardiovascular:      Rate and Rhythm: Normal rate and regular rhythm.      Pulses: Normal pulses and intact distal pulses.      Heart sounds: Normal heart sounds. No murmur heard.     Pulmonary:      Effort: Pulmonary effort is normal.      Breath sounds: Normal breath sounds.   Abdominal:      Palpations: Abdomen is soft.      Tenderness: There is no abdominal tenderness.   Musculoskeletal:         General: Normal range of motion.      Cervical back: Normal range of motion.   Skin:     General: Skin is warm and dry.      Findings: No rash.   Neurological:      General: No focal deficit present.      Mental Status: She is alert and oriented to person, place, and time.      Cranial Nerves: No cranial nerve deficit.   Psychiatric:         Mood and Affect: Mood normal.         Behavior: Behavior normal.         Thought Content: Thought content normal.         Judgment: Judgment normal.           ECG 12 Lead    Date/Time: 9/13/2021 10:15 AM  Performed by: Jesus Man MD  Authorized by: Jesus Man MD   Comparison: compared with previous ECG   Similar to previous ECG  Rhythm: sinus rhythm  Conduction: conduction normal  ST Segments: ST segments normal  T Waves: T waves normal  QRS axis: normal  Other: no other findings  Other findings: poor R wave progression    Clinical impression: normal ECG            Assessment:       Diagnosis Plan   1. Amaurosis fugax     2. Stenosis of right carotid artery     3. Essential hypertension            Plan:       It's quite concerning that she had two episodes of amaurosis fugax within 18 months, and in both eyes.  She has mild carotid disease, and presumably this is still atheroembolic, as we have not seen any atrial arrhythmias on her LINQ.  She will remain on clopidogrel and atorvastatin.     Her LINQ home monitoring device is acting up; it's sending us near daily updates and sending her alarms in the middle of the night; I'll ask our pacemaker department to contact her.      Sincerely,       Jesus Man MD

## 2021-10-22 PROCEDURE — G2066 INTER DEVC REMOTE 30D: HCPCS | Performed by: INTERNAL MEDICINE

## 2021-10-22 PROCEDURE — 93298 REM INTERROG DEV EVAL SCRMS: CPT | Performed by: INTERNAL MEDICINE

## 2021-11-02 ENCOUNTER — TRANSCRIBE ORDERS (OUTPATIENT)
Dept: ADMINISTRATIVE | Facility: HOSPITAL | Age: 81
End: 2021-11-02

## 2021-11-02 DIAGNOSIS — Z12.31 VISIT FOR SCREENING MAMMOGRAM: Primary | ICD-10-CM

## 2021-11-22 PROCEDURE — G2066 INTER DEVC REMOTE 30D: HCPCS | Performed by: INTERNAL MEDICINE

## 2021-11-22 PROCEDURE — 93298 REM INTERROG DEV EVAL SCRMS: CPT | Performed by: INTERNAL MEDICINE

## 2021-12-23 PROCEDURE — 93298 REM INTERROG DEV EVAL SCRMS: CPT | Performed by: INTERNAL MEDICINE

## 2021-12-23 PROCEDURE — G2066 INTER DEVC REMOTE 30D: HCPCS | Performed by: INTERNAL MEDICINE

## 2022-01-05 ENCOUNTER — HOSPITAL ENCOUNTER (OUTPATIENT)
Dept: MAMMOGRAPHY | Facility: HOSPITAL | Age: 82
Discharge: HOME | End: 2022-01-05
Admitting: FAMILY MEDICINE

## 2022-01-05 DIAGNOSIS — Z12.31 VISIT FOR SCREENING MAMMOGRAM: ICD-10-CM

## 2022-01-05 PROCEDURE — 77063 BREAST TOMOSYNTHESIS BI: CPT

## 2022-01-05 PROCEDURE — 77067 SCR MAMMO BI INCL CAD: CPT

## 2022-01-23 PROCEDURE — G2066 INTER DEVC REMOTE 30D: HCPCS | Performed by: INTERNAL MEDICINE

## 2022-01-23 PROCEDURE — 93298 REM INTERROG DEV EVAL SCRMS: CPT | Performed by: INTERNAL MEDICINE

## 2022-02-23 PROCEDURE — G2066 INTER DEVC REMOTE 30D: HCPCS | Performed by: INTERNAL MEDICINE

## 2022-02-23 PROCEDURE — 93298 REM INTERROG DEV EVAL SCRMS: CPT | Performed by: INTERNAL MEDICINE

## 2022-03-26 PROCEDURE — G2066 INTER DEVC REMOTE 30D: HCPCS | Performed by: INTERNAL MEDICINE

## 2022-03-26 PROCEDURE — 93298 REM INTERROG DEV EVAL SCRMS: CPT | Performed by: INTERNAL MEDICINE

## 2022-11-08 ENCOUNTER — OFFICE VISIT (OUTPATIENT)
Dept: CARDIOLOGY | Facility: CLINIC | Age: 82
End: 2022-11-08

## 2022-11-08 VITALS
BODY MASS INDEX: 28.7 KG/M2 | WEIGHT: 178.6 LBS | HEIGHT: 66 IN | DIASTOLIC BLOOD PRESSURE: 60 MMHG | HEART RATE: 75 BPM | SYSTOLIC BLOOD PRESSURE: 110 MMHG

## 2022-11-08 DIAGNOSIS — Z01.810 ENCOUNTER FOR PRE-OPERATIVE CARDIOVASCULAR CLEARANCE: ICD-10-CM

## 2022-11-08 DIAGNOSIS — Z45.09 ENCOUNTER FOR LOOP RECORDER AT END OF BATTERY LIFE: Primary | ICD-10-CM

## 2022-11-08 DIAGNOSIS — D64.9 ANEMIA, UNSPECIFIED TYPE: ICD-10-CM

## 2022-11-08 DIAGNOSIS — I10 ESSENTIAL HYPERTENSION: ICD-10-CM

## 2022-11-08 DIAGNOSIS — G45.3 AMAUROSIS FUGAX: ICD-10-CM

## 2022-11-08 DIAGNOSIS — I67.82 TEMPORARY CEREBRAL VASCULAR DYSFUNCTION: ICD-10-CM

## 2022-11-08 DIAGNOSIS — I65.23 BILATERAL CAROTID ARTERY STENOSIS: ICD-10-CM

## 2022-11-08 DIAGNOSIS — E78.1 HYPERTRIGLYCERIDEMIA: ICD-10-CM

## 2022-11-08 PROBLEM — N28.89 RIGHT RENAL MASS: Status: ACTIVE | Noted: 2022-02-25

## 2022-11-08 PROBLEM — H26.493 BILATERAL POSTERIOR CAPSULAR OPACIFICATION: Status: RESOLVED | Noted: 2021-10-27 | Resolved: 2022-11-08

## 2022-11-08 PROBLEM — Z90.5 H/O PARTIAL NEPHRECTOMY: Status: ACTIVE | Noted: 2022-03-04

## 2022-11-08 PROBLEM — N12 PYELONEPHRITIS: Status: ACTIVE | Noted: 2021-11-08

## 2022-11-08 PROBLEM — H26.493 BILATERAL POSTERIOR CAPSULAR OPACIFICATION: Status: ACTIVE | Noted: 2021-10-27

## 2022-11-08 PROBLEM — S06.2XAA LACERATION AND CONTUSION OF CEREBRAL CORTEX (HCC): Status: ACTIVE | Noted: 2021-05-06

## 2022-11-08 PROBLEM — M19.011 GLENOHUMERAL ARTHRITIS, RIGHT: Status: ACTIVE | Noted: 2022-09-21

## 2022-11-08 PROBLEM — N39.0 CHRONIC UTI: Status: ACTIVE | Noted: 2022-07-26

## 2022-11-08 PROCEDURE — 93000 ELECTROCARDIOGRAM COMPLETE: CPT | Performed by: NURSE PRACTITIONER

## 2022-11-08 PROCEDURE — 99214 OFFICE O/P EST MOD 30 MIN: CPT | Performed by: NURSE PRACTITIONER

## 2022-11-08 RX ORDER — LIRAGLUTIDE 6 MG/ML
INJECTION SUBCUTANEOUS
COMMUNITY
Start: 2022-08-17

## 2022-11-08 RX ORDER — HYDROCHLOROTHIAZIDE 25 MG/1
TABLET ORAL
COMMUNITY
Start: 2022-10-14

## 2022-11-08 RX ORDER — QUINAPRIL 40 MG/1
40 TABLET ORAL DAILY
COMMUNITY
Start: 2022-10-14 | End: 2022-11-08 | Stop reason: SDUPTHER

## 2022-11-08 RX ORDER — QUINAPRIL 40 MG/1
TABLET ORAL
COMMUNITY
Start: 2022-10-17 | End: 2022-11-08 | Stop reason: ALTCHOICE

## 2022-11-08 RX ORDER — EZETIMIBE 10 MG/1
10 TABLET ORAL DAILY
Qty: 30 TABLET | Refills: 11 | COMMUNITY
Start: 2022-05-05 | End: 2023-05-05

## 2022-11-08 NOTE — PROGRESS NOTES
Date of Office Visit: 2022  Encounter Provider: LARRY Noguera  Place of Service: [facility] CARDIOLOGY  Patient Name: Casandra Kevin  :1940  Primary Cardiologist: Dr. Jesus Man     Chief Complaint   Patient presents with   • Follow-up   • Hypertension   :     HPI: Casandra Kevin is a pleasant 81 y.o. female who presents today for follow-up on loop recorder.  I have reviewed her medical records.    In , she had an episode of left amaurosis fugax and was diagnosed with mild bilateral carotid disease.  She was placed on clopidogrel, but decided to take it every other day due to bruising.    In May 2020, she had a brief episode of right-sided amaurosis fugax.  Carotid duplex showed mild disease (less than 50% on the right, less than 15% on the left).  Echocardiogram was normal except for mild aortic sclerosis.  13-day Holter monitor showed NSR with benign ectopy.  A loop recorder was implanted.  Dr. Leticia Sun now follows her carotid artery disease.    I requested her last records from Dr. Leticia Sun's office.  In 2022, carotid duplex showed 50-60% mild to moderate stenosis on the right and 15-49% mild stenosis on the left.  She was recommended to continue aspirin, clopidogrel, and statin.  She was recommended to return in 1 year.    She presents today for follow-up visit and her  is accompanying her.  In 2022, her loop recorder was checked and normal.  Now her battery is depleted and she is not interested in having it explanted.  She informs me that she is scheduled to have a bladder stimulator placed on  (surgeon unknown) and ordered by Dr. Lane.  She denies TIA/stroke symptoms, chest pain, shortness of breath, palpitations, edema, dizziness, syncope, or bleeding.  Blood pressure and heart rate are normal.  I reviewed her recent blood work and she was slightly anemic and triglycerides were elevated.      Past Medical History:    Diagnosis Date   • Carotid artery stenosis     right 15-50%, left 1-15%   • COPD (chronic obstructive pulmonary disease) (HCC)    • Encounter for loop recorder at end of battery life 3/1/2022   • Hyperlipidemia    • Hypertension    • Hypothyroidism    • Obesity (BMI 30-39.9)    • Thyroid nodule 2016    Impression: 2015 - Dr. Flores is monitoring this.  He elected not to bx.  She has an u/s tomorrow.  Impression: 2015 - She has seen Dr. Flores and he has recommended a bx of the nodule in the coming months according to Casandra.  I think it should be ok for her to come off the plavix for 7 days to have hudson bx done.  We talked at length about risks of coming off plavix vs risk of not proceed   • Type 2 diabetes mellitus (HCC)    • Vitamin D deficiency 2016       Past Surgical History:   Procedure Laterality Date   • APPENDECTOMY     • BREAST BIOPSY     • CARDIAC ELECTROPHYSIOLOGY PROCEDURE N/A 2020    Procedure: Loop insertion LINQ;  Surgeon: Olvin Bonner MD;  Location: [facility] INVASIVE LOCATION;  Service: Cardiovascular;  Laterality: N/A;   • CATARACT EXTRACTION Bilateral    • CHOLECYSTECTOMY     • TONSILLECTOMY         Social History     Socioeconomic History   • Marital status:    • Number of children: 3   Tobacco Use   • Smoking status: Former     Years: 3.00     Types: Cigarettes     Quit date:      Years since quittin.8   • Smokeless tobacco: Never   Substance and Sexual Activity   • Alcohol use: No     Comment: Caffeine use: None   • Drug use: No   • Sexual activity: Defer       Family History   Problem Relation Age of Onset   • Aneurysm Mother    • Hyperlipidemia Mother    • Cancer Father         throat secondary to smoking   • Coronary artery disease Brother         17 CARDIAC STENTS   • Colon cancer Brother        The following portion of the patient's history were reviewed and updated as appropriate: past medical history, past surgical history, past social  history, past family history, allergies, current medications, and problem list.    Review of Systems   Constitutional: Negative.   Cardiovascular: Negative.    Respiratory: Negative.    Hematologic/Lymphatic: Bruises/bleeds easily.   Musculoskeletal: Positive for joint pain.   Neurological: Negative.        Allergies   Allergen Reactions   • Codeine Mental Status Change     STATES THE MED CAUSES SEVERE DEPRESSION         Current Outpatient Medications:   •  albuterol (PROVENTIL HFA;VENTOLIN HFA) 108 (90 BASE) MCG/ACT inhaler, Every 6 (Six) Hours As Needed., Disp: , Rfl:   •  amLODIPine (NORVASC) 10 MG tablet, TAKE 1 TABLET BY MOUTH EVERY DAY, Disp: 90 tablet, Rfl: 1  •  atorvastatin (LIPITOR) 40 MG tablet, Take 40 mg by mouth Daily., Disp: , Rfl:   •  budesonide-formoterol (SYMBICORT) 160-4.5 MCG/ACT inhaler, 2 (two) times a day., Disp: , Rfl:   •  Calcium Carbonate-Vit D-Min (CALCIUM 1200 PO), Take  by mouth Daily., Disp: , Rfl:   •  clopidogrel (PLAVIX) 75 MG tablet, Take  by mouth Every Other Day., Disp: , Rfl:   •  CRANBERRY PO, Take  by mouth Daily., Disp: , Rfl:   •  ezetimibe (ZETIA) 10 MG tablet, Take 1 tablet by mouth Daily., Disp: 30 tablet, Rfl: 11  •  guaiFENesin (MUCINEX) 600 MG 12 hr tablet, Take  by mouth Daily., Disp: , Rfl:   •  hydroCHLOROthiazide (HYDRODIURIL) 25 MG tablet, , Disp: , Rfl:   •  icosapent ethyl (VASCEPA) 1 g capsule capsule, Take 2 g by mouth 2 (Two) Times a Day., Disp: , Rfl:   •  metoprolol tartrate (LOPRESSOR) 50 MG tablet, TAKE 1 TABLET BY MOUTH TWICE A DAY., Disp: 180 tablet, Rfl: 1  •  omega-3 acid ethyl esters (LOVAZA) 1 g capsule, TAKE 1 CAPSULE BY MOUTH DAILY (Patient taking differently: Take 1 capsule by mouth 2 (Two) Times a Day.), Disp: 90 capsule, Rfl: 0  •  quinapril-hydrochlorothiazide (ACCURETIC) 20-12.5 MG per tablet, Take 2 tablets by mouth Daily., Disp: 60 tablet, Rfl: 0  •  sitaGLIPtin-metFORMIN (JANUMET)  MG per tablet, Take 1 tablet by mouth 2 (Two)  "Times a Day With Meals., Disp: 60 tablet, Rfl: 1  •  Victoza 18 MG/3ML solution pen-injector injection, , Disp: , Rfl:         Objective:     Vitals:    11/08/22 1314   BP: 110/60   BP Location: Left arm   Patient Position: Sitting   Cuff Size: Adult   Pulse: 75   Weight: 81 kg (178 lb 9.6 oz)   Height: 167.6 cm (66\")     Body mass index is 28.83 kg/m².    PHYSICAL EXAM:    Vitals Reviewed.   General Appearance: No acute distress, well developed and well nourished.    Eyes: Conjunctiva and lids: No erythema, swelling, or discharge. Sclera non-icteric. Glasses.   HENT: Atraumatic, normocephalic. External eyes, ears, and nose normal. No hearing loss noted. Mucous membranes normal. Lips not cyanotic. Neck supple with no tenderness. Wearing mask.   Respiratory: No signs of respiratory distress. Respiration rhythm and depth normal.   Clear to auscultation. No rales, crackles, rhonchi, or wheezing auscultated.   Cardiovascular:  Jugular Venous Pressure: Normal  Heart Rate and Rhythm: Normal, Heart Sounds: Normal S1 and S2. No S3 or S4 noted.  Murmurs: No murmurs noted. No rubs, thrills, or gallops.   Lower Extremities: No edema noted.  Gastrointestinal:  Abdomen soft, non-distended, non-tender.    Musculoskeletal: Normal movement of extremities.  Skin and Nails: General appearance normal. No pallor, cyanosis, diaphoresis. Skin temperature normal. No clubbing of fingernails.   Psychiatric: Patient alert and oriented to person, place, and time. Speech and behavior appropriate. Normal mood and affect.       ECG 12 Lead    Date/Time: 11/8/2022 1:18 PM  Performed by: Lindsay Hollis APRN  Authorized by: Lindsay Hollis APRN   Comparison: compared with previous ECG from 9/13/2021  Similar to previous ECG  Rhythm: sinus rhythm  Rate: normal  BPM: 75  Q waves: V1 and V2    ST Segments: ST segments normal  T Waves: T waves normal  QRS axis: normal    Clinical impression: non-specific ECG              Assessment:       " Diagnosis Plan   1. Encounter for loop recorder at end of battery life        2. Temporary cerebral vascular dysfunction        3. Amaurosis fugax        4. Bilateral carotid artery stenosis        5. Essential hypertension        6. Anemia, unspecified type        7. Hypertriglyceridemia        8. Encounter for pre-operative cardiovascular clearance               Plan:       1.  Loop Recorder: This is reached end of battery life.  There was no atrial fibrillation noted during her monitoring.  She is not interested in having the device explanted.    2/3.  TIA/Amaurosis Fugax: No further neurological symptoms.      4.  Bilateral Carotid Artery Stenosis: Less than 50% on the right and less than 15% on the left.  Follows with Dr. Leticia Sun.  Takes clopidogrel every other day due to bruising and remains on a atorvastatin.    5.  Hypertension: Blood pressure well controlled.    6/7.  Mild anemia and hypertriglyceridemia noted on last blood work.  Defer to PCP.    8.  Perioperative Cardiac Risk Assessment: She is scheduled to have a bladder stimulator placed on 11/23 ordered by Dr. Lane. Patient is considered at acceptable risk for surgery from a cardiovascular standpoint. According to the Billy's Revised Cardiac Risk Index, patient is considered low risk (0.9%) of adverse cardiovascular events occurring with moderate risk surgery.  We do not prescribe her clopidogrel so we will not be making recommendations on holding that medication before surgery.  Please defer to prescribing provider.    9.  She would like to follow-up with Dr. Jesus Man in 1 year.    As always, it has been a pleasure to participate in your patient's care. Thank you.       Sincerely,         LARRY Ramos  [facility] Cardiology      · Dictated utilizing Dragon Dictation  · COVID-19 Precautions - Patient was compliant in wearing a mask. When I saw the patient, I used appropriate personal protective equipment (PPE)  including mask and eye shield (standard procedure).  Additionally, I used gown and gloves if indicated.  Hand hygiene was completed before and after seeing the patient.  · I spent 30 minutes reviewing her medical records/testing/previous office notes/labs, face-to-face interaction with patient, physical examination, formulating the plan of care, and discussion of plan of care with patient.

## 2022-11-23 ENCOUNTER — TRANSCRIBE ORDERS (OUTPATIENT)
Dept: ADMINISTRATIVE | Facility: HOSPITAL | Age: 82
End: 2022-11-23

## 2022-11-23 DIAGNOSIS — Z12.31 SCREENING MAMMOGRAM, ENCOUNTER FOR: Primary | ICD-10-CM

## 2022-12-08 ENCOUNTER — TELEPHONE (OUTPATIENT)
Dept: CARDIOLOGY | Facility: CLINIC | Age: 82
End: 2022-12-08

## 2023-02-20 ENCOUNTER — LAB (OUTPATIENT)
Dept: LAB | Facility: HOSPITAL | Age: 83
End: 2023-02-20
Payer: MEDICARE

## 2023-02-20 ENCOUNTER — TRANSCRIBE ORDERS (OUTPATIENT)
Dept: ADMINISTRATIVE | Facility: HOSPITAL | Age: 83
End: 2023-02-20
Payer: MEDICARE

## 2023-02-20 DIAGNOSIS — Z01.818 PREOP EXAMINATION: Primary | ICD-10-CM

## 2023-02-20 DIAGNOSIS — Z01.818 PREOP EXAMINATION: ICD-10-CM

## 2023-02-20 LAB
ANION GAP SERPL CALCULATED.3IONS-SCNC: 10 MMOL/L (ref 5–15)
BUN SERPL-MCNC: 29 MG/DL (ref 8–23)
BUN/CREAT SERPL: 27.6 (ref 7–25)
CALCIUM SPEC-SCNC: 10.2 MG/DL (ref 8.6–10.5)
CHLORIDE SERPL-SCNC: 105 MMOL/L (ref 98–107)
CO2 SERPL-SCNC: 28 MMOL/L (ref 22–29)
CREAT SERPL-MCNC: 1.05 MG/DL (ref 0.57–1)
DEPRECATED RDW RBC AUTO: 45 FL (ref 37–54)
EGFRCR SERPLBLD CKD-EPI 2021: 53.2 ML/MIN/1.73
ERYTHROCYTE [DISTWIDTH] IN BLOOD BY AUTOMATED COUNT: 14.2 % (ref 12.3–15.4)
GLUCOSE SERPL-MCNC: 126 MG/DL (ref 65–99)
HCT VFR BLD AUTO: 36 % (ref 34–46.6)
HGB BLD-MCNC: 11.4 G/DL (ref 12–15.9)
MCH RBC QN AUTO: 27.5 PG (ref 26.6–33)
MCHC RBC AUTO-ENTMCNC: 31.7 G/DL (ref 31.5–35.7)
MCV RBC AUTO: 87 FL (ref 79–97)
PLATELET # BLD AUTO: 322 10*3/MM3 (ref 140–450)
PMV BLD AUTO: 9.4 FL (ref 6–12)
POTASSIUM SERPL-SCNC: 4.7 MMOL/L (ref 3.5–5.2)
RBC # BLD AUTO: 4.14 10*6/MM3 (ref 3.77–5.28)
SODIUM SERPL-SCNC: 143 MMOL/L (ref 136–145)
WBC NRBC COR # BLD: 9.28 10*3/MM3 (ref 3.4–10.8)

## 2023-02-20 PROCEDURE — 80048 BASIC METABOLIC PNL TOTAL CA: CPT

## 2023-02-20 PROCEDURE — 85027 COMPLETE CBC AUTOMATED: CPT

## 2023-02-20 PROCEDURE — 36415 COLL VENOUS BLD VENIPUNCTURE: CPT

## 2023-11-01 NOTE — PROGRESS NOTES
RM:________     PCP: Jake Monaco MD    : 1940  AGE: 82 y.o.  EST PATIENT     REASON FOR VISIT/  CC:        BP Readings from Last 3 Encounters:   22 110/60   21 140/70   09/10/20 150/80      Wt Readings from Last 3 Encounters:   22 81 kg (178 lb 9.6 oz)   21 93.4 kg (205 lb 12.8 oz)   09/10/20 92.4 kg (203 lb 12.8 oz)        WT: ____________ BP: __________L __________R HR______    CHEST PAIN: _____________    SOA: _____________PALPS: _______________     LIGHTHEADED: ___________FATIGUE: ________________ EDEMA __________    ALLERGIES:Codeine SMOKING HISTORY:  Social History     Tobacco Use    Smoking status: Former     Years: 3     Types: Cigarettes     Quit date:      Years since quittin.8    Smokeless tobacco: Never   Substance Use Topics    Alcohol use: No     Comment: Caffeine use: None    Drug use: No     CAFFEINE USE_________________  ALCOHOL ______________________

## 2023-11-08 ENCOUNTER — OFFICE VISIT (OUTPATIENT)
Dept: CARDIOLOGY | Facility: CLINIC | Age: 83
End: 2023-11-08
Payer: MEDICARE

## 2023-11-08 VITALS
HEIGHT: 66 IN | WEIGHT: 186.6 LBS | DIASTOLIC BLOOD PRESSURE: 72 MMHG | BODY MASS INDEX: 29.99 KG/M2 | HEART RATE: 80 BPM | SYSTOLIC BLOOD PRESSURE: 108 MMHG

## 2023-11-08 DIAGNOSIS — G45.3 AMAUROSIS FUGAX: Primary | ICD-10-CM

## 2023-11-08 DIAGNOSIS — I65.23 BILATERAL CAROTID ARTERY STENOSIS: ICD-10-CM

## 2023-11-08 DIAGNOSIS — I10 ESSENTIAL HYPERTENSION: ICD-10-CM

## 2023-11-08 DIAGNOSIS — R20.2 LEG PARESTHESIA: ICD-10-CM

## 2023-11-08 PROBLEM — S06.2XAA LACERATION AND CONTUSION OF CEREBRAL CORTEX: Status: RESOLVED | Noted: 2021-05-06 | Resolved: 2023-11-08

## 2023-11-08 PROBLEM — Z45.09 ENCOUNTER FOR LOOP RECORDER AT END OF BATTERY LIFE: Status: RESOLVED | Noted: 2022-03-01 | Resolved: 2023-11-08

## 2023-11-08 RX ORDER — ATORVASTATIN CALCIUM 80 MG/1
TABLET, FILM COATED ORAL DAILY
COMMUNITY
Start: 2023-08-16

## 2023-11-08 RX ORDER — EZETIMIBE 10 MG/1
10 TABLET ORAL DAILY
COMMUNITY
Start: 2023-06-15 | End: 2024-06-14

## 2023-11-08 RX ORDER — LISINOPRIL 40 MG/1
40 TABLET ORAL DAILY
COMMUNITY
Start: 2023-02-07 | End: 2024-02-07

## 2023-11-08 NOTE — PROGRESS NOTES
Date of Office Visit: 2023  Encounter Provider: Jesus Man MD  Place of Service: [facility] CARDIOLOGY  Patient Name: Casandra Kevin  :1940    Chief Complaint   Patient presents with    Amaurosis fugax   :   HPI:     Ms. Kevin is 82 y.o. and presents today to follow up. I have reviewed prior notes and there are no changes except for any new updates described below. I have also reviewed any information entered into the medical record by the patient or by ancillary staff.     She had a previous episode of left amaurosis fugax and was diagnosed with mild bilateral carotid artery stenosis in .  She was placed on clopidogrel; however, she only takes it every other day due to bruising.    In May 2020, she had a brief episode of right sided amaurosis fugax.  A carotid doppler again showed mild disease (<50% on the right, <15% on the left). An echo was unremarkable (other than mild aortic sclerosis); a 13 day monitor showed benign ectopy. A LINQ was implanted; so far, we have not caught any atrial arrhythmias.     She denies chest pain, leg swelling, orthopnea, PND, lightheadedness, syncope, or palpitations.  She has mild chronic dyspnea and has COPD.    Past Medical History:   Diagnosis Date    Carotid artery stenosis     right 15-50%, left 1-15%    COPD (chronic obstructive pulmonary disease)     Encounter for loop recorder at end of battery life 2022    Hyperlipidemia     Hypertension     Hypothyroidism     Obesity (BMI 30-39.9)     Thyroid nodule 2016    Impression: 2015 - Dr. Flores is monitoring this.  He elected not to bx.  She has an u/s tomorrow.  Impression: 2015 - She has seen Dr. Flores and he has recommended a bx of the nodule in the coming months according to Casandra.  I think it should be ok for her to come off the plavix for 7 days to have hudson bx done.  We talked at length about risks of coming off plavix vs risk of not proceed     Type 2 diabetes mellitus     Vitamin D deficiency 2016       Past Surgical History:   Procedure Laterality Date    APPENDECTOMY      BREAST BIOPSY      CARDIAC ELECTROPHYSIOLOGY PROCEDURE N/A 2020    Procedure: Loop insertion LINQ;  Surgeon: Olvin Bonner MD;  Location: [facility] INVASIVE LOCATION;  Service: Cardiovascular;  Laterality: N/A;    CATARACT EXTRACTION Bilateral     CHOLECYSTECTOMY      TONSILLECTOMY         Social History     Socioeconomic History    Marital status:     Number of children: 3   Tobacco Use    Smoking status: Former     Years: 3     Types: Cigarettes     Quit date:      Years since quittin.8    Smokeless tobacco: Never   Vaping Use    Vaping Use: Never used   Substance and Sexual Activity    Alcohol use: No     Comment: Caffeine use: None    Drug use: No    Sexual activity: Defer       Family History   Problem Relation Age of Onset    Aneurysm Mother     Hyperlipidemia Mother     Cancer Father         throat secondary to smoking    Coronary artery disease Brother         17 CARDIAC STENTS    Colon cancer Brother        Review of Systems   Cardiovascular:  Negative for chest pain and palpitations.   Respiratory:  Negative for shortness of breath.    Musculoskeletal:  Positive for joint pain.   Neurological:  Positive for paresthesias. Negative for dizziness and light-headedness.   All other systems reviewed and are negative.      Allergies   Allergen Reactions    Codeine Mental Status Change     STATES THE MED CAUSES SEVERE DEPRESSION         Current Outpatient Medications:     albuterol (PROVENTIL HFA;VENTOLIN HFA) 108 (90 BASE) MCG/ACT inhaler, Every 6 (Six) Hours As Needed., Disp: , Rfl:     amLODIPine (NORVASC) 10 MG tablet, TAKE 1 TABLET BY MOUTH EVERY DAY, Disp: 90 tablet, Rfl: 1    atorvastatin (LIPITOR) 80 MG tablet, Daily., Disp: , Rfl:     budesonide-formoterol (SYMBICORT) 160-4.5 MCG/ACT inhaler, 2 (two) times a day., Disp: , Rfl:     Calcium  "Carbonate-Vit D-Min (CALCIUM 1200 PO), Take  by mouth Daily., Disp: , Rfl:     clopidogrel (PLAVIX) 75 MG tablet, Take  by mouth Every Other Day., Disp: , Rfl:     CRANBERRY PO, Take  by mouth Daily., Disp: , Rfl:     ezetimibe (ZETIA) 10 MG tablet, Take 1 tablet by mouth Daily., Disp: , Rfl:     guaiFENesin (MUCINEX) 600 MG 12 hr tablet, Take  by mouth Daily., Disp: , Rfl:     hydroCHLOROthiazide (HYDRODIURIL) 25 MG tablet, Daily., Disp: , Rfl:     icosapent ethyl (VASCEPA) 1 g capsule capsule, Take 2 g by mouth 2 (Two) Times a Day., Disp: , Rfl:     lisinopril (PRINIVIL,ZESTRIL) 40 MG tablet, Take 1 tablet by mouth Daily., Disp: , Rfl:     metoprolol tartrate (LOPRESSOR) 50 MG tablet, TAKE 1 TABLET BY MOUTH TWICE A DAY., Disp: 180 tablet, Rfl: 1    sitaGLIPtin-metFORMIN (JANUMET)  MG per tablet, Take 1 tablet by mouth 2 (Two) Times a Day With Meals., Disp: 60 tablet, Rfl: 1    Victoza 18 MG/3ML solution pen-injector injection, , Disp: , Rfl:       Objective:     Vitals:    11/08/23 1339   BP: 108/72   BP Location: Left arm   Pulse: 80   Weight: 84.6 kg (186 lb 9.6 oz)   Height: 167.6 cm (66\")       Body mass index is 30.12 kg/m².    Physical Exam  Vitals reviewed.   Constitutional:       Appearance: She is well-developed.   HENT:      Head: Normocephalic.      Nose: Nose normal.      Mouth/Throat:      Pharynx: Oropharynx is clear.   Eyes:      Conjunctiva/sclera: Conjunctivae normal.   Neck:      Vascular: No JVD.   Cardiovascular:      Rate and Rhythm: Normal rate and regular rhythm.      Pulses: Normal pulses and intact distal pulses.      Heart sounds: Normal heart sounds. No murmur heard.  Pulmonary:      Effort: Pulmonary effort is normal.      Breath sounds: Normal breath sounds.   Abdominal:      Palpations: Abdomen is soft.      Tenderness: There is no abdominal tenderness.   Musculoskeletal:         General: No swelling. Normal range of motion.      Cervical back: Normal range of motion.   Skin:   "   General: Skin is warm and dry.      Findings: No rash.   Neurological:      General: No focal deficit present.      Mental Status: She is alert.      Cranial Nerves: No cranial nerve deficit.   Psychiatric:         Mood and Affect: Mood normal.         Behavior: Behavior normal.           ECG 12 Lead    Date/Time: 11/8/2023 2:00 PM  Performed by: Jesus Man MD    Authorized by: Jesus Man MD  Comparison: compared with previous ECG   Similar to previous ECG  Rhythm: sinus rhythm  Conduction: non-specific intraventricular conduction delay  T Waves: T waves normal  QRS axis: left  Other findings: left ventricular hypertrophy    Clinical impression: abnormal EKG          Assessment:       Diagnosis Plan   1. Amaurosis fugax        2. Bilateral carotid artery stenosis        3. Essential hypertension        4. Leg paresthesia                 Plan:       It's quite concerning that she had two episodes of amaurosis fugax within 18 months, and in both eyes.  She has mild carotid disease, which is followed by Dr Sun. Presumably, these events were atheroembolic; she had a loop recorder for three years that showed no arrhythmias.  She still has device in place and would like to hold off on removal unless it is causing problems.  I believe that is apparently reasonable plan.  She will remain on clopidogrel and atorvastatin.     Her blood pressure is perfectly controlled on her current regimen.    She reports right lower extremity paresthesias with ambulation.  It is not claudication and she has excellent pulses.  She is going to see an orthopedic surgeon soon.    Sincerely,       Jesus Man MD

## 2024-07-09 RX ORDER — CLOPIDOGREL BISULFATE 75 MG/1
75 TABLET ORAL DAILY
Qty: 90 TABLET | Refills: 2 | Status: SHIPPED | OUTPATIENT
Start: 2024-07-09

## 2024-10-09 DIAGNOSIS — I65.23 BILATERAL CAROTID ARTERY STENOSIS: Primary | ICD-10-CM

## 2025-04-10 RX ORDER — CLOPIDOGREL BISULFATE 75 MG/1
75 TABLET ORAL DAILY
Qty: 90 TABLET | Refills: 2 | Status: SHIPPED | OUTPATIENT
Start: 2025-04-10

## 2025-04-28 NOTE — PROGRESS NOTES
Chief Complaint  Bilateral carotid artery stenosis  Follow-up carotid artery occlusive disease    Subjective        Casandra Kevin presents to [facility] VASCULAR SURGERY  History of Present Illness   the patient is an 84-year-old female with a history of mild carotid stenosis and bilateral amaurosis fugax.  Her workup demonstrated stenotic changes at the carotid siphon which was felt to be the etiology of her amaurosis.  She is on Plavix and a statin agent.  She has remained asymptomatic.  She was last seen on August 2, 2023 and this demonstrated less than 50% stenosis bilaterally.  She returned on May 7, 2025 with a carotid duplex scan.  This demonstrated 50 to 69% stenosis on the right and less than 50% stenosis on the left.  There has been a progression on the right compared to previous studies.  Remained asymptomatic with no further issues with amaurosis fugax.    Past History:  Medical History: has a past medical history of Carotid artery stenosis, COPD (chronic obstructive pulmonary disease), Encounter for loop recorder at end of battery life (03/01/2022), Hyperlipidemia, Hypertension, Hypothyroidism, Obesity (BMI 30-39.9), Thyroid nodule (02/14/2016), Type 2 diabetes mellitus, and Vitamin D deficiency (02/14/2016).   Surgical History: has a past surgical history that includes Appendectomy; Cholecystectomy; Tonsillectomy; Breast biopsy; Cataract extraction (Bilateral); and Cardiac electrophysiology procedure (N/A, 8/13/2020).   Family History: family history includes Aneurysm in her mother; Cancer in her father; Colon cancer in her brother; Coronary artery disease in her brother; Hyperlipidemia in her mother.   Social History: reports that she quit smoking about 63 years ago. Her smoking use included cigarettes. She started smoking about 66 years ago. She has never used smokeless tobacco. She reports that she does not drink alcohol and does not use drugs.    (Not in a hospital admission)    "  Allergies: Codeine   Objective   Vital Signs:  /78   Ht 167.6 cm (66\")   Wt 87.1 kg (192 lb)   BMI 30.99 kg/m²   Estimated body mass index is 30.99 kg/m² as calculated from the following:    Height as of this encounter: 167.6 cm (66\").    Weight as of this encounter: 87.1 kg (192 lb).     BMI is >= 30 and <35. (Class 1 Obesity). The following options were offered after discussion;: referral to primary care    Casandar Kevin  reports that she quit smoking about 63 years ago. Her smoking use included cigarettes. She started smoking about 66 years ago. She has never used smokeless tobacco.       Physical Exam   Result Review :        Data reviewed : Carotid duplex scan from May 7, 2025, findings as above             Assessment and Plan     Diagnoses and all orders for this visit:    1. Bilateral carotid artery stenosis (Primary)  -     Duplex Carotid Ultrasound CAR; Future    2. History of TIA (transient ischemic attack)  -     Duplex Carotid Ultrasound CAR; Future    Because of her progression of disease on the right I have recommended that we follow her at a closer interval, at 6 months as opposed to a year or more.  Fortunately, she has remained asymptomatic.  She is on optimal medical therapy with dual antiplatelet therapy of aspirin and Plavix as well as her statin agent.  Nothing further needed from a medication standpoint.  I will plan on seeing her in follow-up in 6 months with a carotid duplex scan.         Follow Up     Return in about 6 months (around 11/7/2025) for With carotid duplex scan.  Patient was given instructions and counseling regarding her condition or for health maintenance advice. Please see specific information pulled into the AVS if appropriate.       "

## 2025-05-07 ENCOUNTER — OFFICE VISIT (OUTPATIENT)
Age: 85
End: 2025-05-07
Payer: MEDICARE

## 2025-05-07 ENCOUNTER — HOSPITAL ENCOUNTER (OUTPATIENT)
Facility: HOSPITAL | Age: 85
Discharge: HOME | End: 2025-05-07
Admitting: SURGERY
Payer: MEDICARE

## 2025-05-07 VITALS
HEIGHT: 66 IN | SYSTOLIC BLOOD PRESSURE: 125 MMHG | DIASTOLIC BLOOD PRESSURE: 78 MMHG | WEIGHT: 192 LBS | BODY MASS INDEX: 30.86 KG/M2

## 2025-05-07 DIAGNOSIS — I65.23 BILATERAL CAROTID ARTERY STENOSIS: Primary | ICD-10-CM

## 2025-05-07 DIAGNOSIS — I65.23 BILATERAL CAROTID ARTERY STENOSIS: ICD-10-CM

## 2025-05-07 DIAGNOSIS — Z86.73 HISTORY OF TIA (TRANSIENT ISCHEMIC ATTACK): ICD-10-CM

## 2025-05-07 LAB
BH CV XLRA MEAS LEFT CAROTID BULB EDV: 8.1 CM/SEC
BH CV XLRA MEAS LEFT CAROTID BULB PSV: 33 CM/SEC
BH CV XLRA MEAS LEFT DIST CCA EDV: 12.8 CM/SEC
BH CV XLRA MEAS LEFT DIST CCA PSV: 54.5 CM/SEC
BH CV XLRA MEAS LEFT DIST ICA EDV: -29.4 CM/SEC
BH CV XLRA MEAS LEFT DIST ICA PSV: -75.4 CM/SEC
BH CV XLRA MEAS LEFT ICA/CCA RATIO: 1.38
BH CV XLRA MEAS LEFT MID CCA EDV: -12 CM/SEC
BH CV XLRA MEAS LEFT MID CCA PSV: -70.8 CM/SEC
BH CV XLRA MEAS LEFT MID ICA EDV: -15.6 CM/SEC
BH CV XLRA MEAS LEFT MID ICA PSV: -49.8 CM/SEC
BH CV XLRA MEAS LEFT PROX CCA EDV: 14.2 CM/SEC
BH CV XLRA MEAS LEFT PROX CCA PSV: 129 CM/SEC
BH CV XLRA MEAS LEFT PROX ECA EDV: -9.9 CM/SEC
BH CV XLRA MEAS LEFT PROX ECA PSV: -84.2 CM/SEC
BH CV XLRA MEAS LEFT PROX ICA EDV: -14.7 CM/SEC
BH CV XLRA MEAS LEFT PROX ICA PSV: -47.4 CM/SEC
BH CV XLRA MEAS LEFT PROX SCLA PSV: 106.2 CM/SEC
BH CV XLRA MEAS LEFT VERTEBRAL A EDV: 12.9 CM/SEC
BH CV XLRA MEAS LEFT VERTEBRAL A PSV: 34.8 CM/SEC
BH CV XLRA MEAS RIGHT CAROTID BULB EDV: -42.7 CM/SEC
BH CV XLRA MEAS RIGHT CAROTID BULB PSV: -131.8 CM/SEC
BH CV XLRA MEAS RIGHT DIST CCA EDV: -10 CM/SEC
BH CV XLRA MEAS RIGHT DIST CCA PSV: -47.4 CM/SEC
BH CV XLRA MEAS RIGHT DIST ICA EDV: -18 CM/SEC
BH CV XLRA MEAS RIGHT DIST ICA PSV: -57.8 CM/SEC
BH CV XLRA MEAS RIGHT ICA/CCA RATIO: 4.08
BH CV XLRA MEAS RIGHT MID CCA EDV: 12.3 CM/SEC
BH CV XLRA MEAS RIGHT MID CCA PSV: 64.5 CM/SEC
BH CV XLRA MEAS RIGHT MID ICA EDV: -13.4 CM/SEC
BH CV XLRA MEAS RIGHT MID ICA PSV: -75 CM/SEC
BH CV XLRA MEAS RIGHT PROX CCA EDV: 12.7 CM/SEC
BH CV XLRA MEAS RIGHT PROX CCA PSV: 74.3 CM/SEC
BH CV XLRA MEAS RIGHT PROX ECA EDV: -9.9 CM/SEC
BH CV XLRA MEAS RIGHT PROX ECA PSV: -77.8 CM/SEC
BH CV XLRA MEAS RIGHT PROX ICA EDV: -42.7 CM/SEC
BH CV XLRA MEAS RIGHT PROX ICA PSV: -193.2 CM/SEC
BH CV XLRA MEAS RIGHT PROX SCLA PSV: 87.2 CM/SEC
BH CV XLRA MEAS RIGHT VERTEBRAL A EDV: 17.7 CM/SEC
BH CV XLRA MEAS RIGHT VERTEBRAL A PSV: 74.3 CM/SEC
LEFT ARM BP: NORMAL MMHG
RIGHT ARM BP: NORMAL MMHG

## 2025-05-07 PROCEDURE — 93880 EXTRACRANIAL BILAT STUDY: CPT | Performed by: SURGERY

## 2025-05-07 PROCEDURE — 93880 EXTRACRANIAL BILAT STUDY: CPT

## 2025-05-07 RX ORDER — METHENAMINE HIPPURATE 1000 MG/1
1 TABLET ORAL
COMMUNITY
Start: 2024-09-25 | End: 2025-09-25

## 2025-05-07 RX ORDER — TAMSULOSIN HYDROCHLORIDE 0.4 MG/1
0.4 CAPSULE ORAL DAILY
COMMUNITY
Start: 2024-06-19 | End: 2025-06-19

## 2025-05-07 RX ORDER — EZETIMIBE 10 MG/1
10 TABLET ORAL DAILY
COMMUNITY
Start: 2024-05-09 | End: 2025-05-09

## 2025-05-07 RX ORDER — CETIRIZINE HYDROCHLORIDE 5 MG/1
10 TABLET ORAL DAILY
COMMUNITY